# Patient Record
Sex: FEMALE | Race: WHITE | NOT HISPANIC OR LATINO | URBAN - METROPOLITAN AREA
[De-identification: names, ages, dates, MRNs, and addresses within clinical notes are randomized per-mention and may not be internally consistent; named-entity substitution may affect disease eponyms.]

---

## 2017-04-24 ENCOUNTER — OUTPATIENT (OUTPATIENT)
Dept: OUTPATIENT SERVICES | Facility: HOSPITAL | Age: 22
LOS: 1 days | Discharge: HOME | End: 2017-04-24

## 2017-06-27 DIAGNOSIS — R22.1 LOCALIZED SWELLING, MASS AND LUMP, NECK: ICD-10-CM

## 2017-06-27 DIAGNOSIS — R61 GENERALIZED HYPERHIDROSIS: ICD-10-CM

## 2017-06-27 DIAGNOSIS — R00.2 PALPITATIONS: ICD-10-CM

## 2017-10-02 ENCOUNTER — OUTPATIENT (OUTPATIENT)
Dept: OUTPATIENT SERVICES | Facility: HOSPITAL | Age: 22
LOS: 1 days | Discharge: HOME | End: 2017-10-02

## 2017-10-02 DIAGNOSIS — R61 GENERALIZED HYPERHIDROSIS: ICD-10-CM

## 2017-10-02 DIAGNOSIS — R10.2 PELVIC AND PERINEAL PAIN: ICD-10-CM

## 2017-12-26 ENCOUNTER — OUTPATIENT (OUTPATIENT)
Dept: OUTPATIENT SERVICES | Facility: HOSPITAL | Age: 22
LOS: 1 days | Discharge: HOME | End: 2017-12-26

## 2017-12-26 DIAGNOSIS — E78.5 HYPERLIPIDEMIA, UNSPECIFIED: ICD-10-CM

## 2017-12-26 DIAGNOSIS — R00.2 PALPITATIONS: ICD-10-CM

## 2017-12-26 DIAGNOSIS — D64.9 ANEMIA, UNSPECIFIED: ICD-10-CM

## 2017-12-26 DIAGNOSIS — R61 GENERALIZED HYPERHIDROSIS: ICD-10-CM

## 2017-12-26 DIAGNOSIS — R22.1 LOCALIZED SWELLING, MASS AND LUMP, NECK: ICD-10-CM

## 2018-08-03 ENCOUNTER — OUTPATIENT (OUTPATIENT)
Dept: OUTPATIENT SERVICES | Facility: HOSPITAL | Age: 23
LOS: 1 days | Discharge: HOME | End: 2018-08-03

## 2018-08-03 DIAGNOSIS — R00.2 PALPITATIONS: ICD-10-CM

## 2018-08-03 DIAGNOSIS — R22.1 LOCALIZED SWELLING, MASS AND LUMP, NECK: ICD-10-CM

## 2018-08-03 DIAGNOSIS — R61 GENERALIZED HYPERHIDROSIS: ICD-10-CM

## 2019-08-15 ENCOUNTER — OUTPATIENT (OUTPATIENT)
Dept: OUTPATIENT SERVICES | Facility: HOSPITAL | Age: 24
LOS: 1 days | Discharge: HOME | End: 2019-08-15

## 2019-08-15 DIAGNOSIS — E28.2 POLYCYSTIC OVARIAN SYNDROME: ICD-10-CM

## 2019-08-15 DIAGNOSIS — R00.2 PALPITATIONS: ICD-10-CM

## 2019-08-15 DIAGNOSIS — Z00.00 ENCOUNTER FOR GENERAL ADULT MEDICAL EXAMINATION WITHOUT ABNORMAL FINDINGS: ICD-10-CM

## 2019-08-15 DIAGNOSIS — E53.8 DEFICIENCY OF OTHER SPECIFIED B GROUP VITAMINS: ICD-10-CM

## 2019-08-15 DIAGNOSIS — E55.9 VITAMIN D DEFICIENCY, UNSPECIFIED: ICD-10-CM

## 2019-08-15 DIAGNOSIS — R61 GENERALIZED HYPERHIDROSIS: ICD-10-CM

## 2019-10-08 ENCOUNTER — EMERGENCY (EMERGENCY)
Facility: HOSPITAL | Age: 24
LOS: 0 days | Discharge: HOME | End: 2019-10-08
Attending: EMERGENCY MEDICINE | Admitting: EMERGENCY MEDICINE
Payer: COMMERCIAL

## 2019-10-08 VITALS
SYSTOLIC BLOOD PRESSURE: 114 MMHG | HEART RATE: 76 BPM | DIASTOLIC BLOOD PRESSURE: 77 MMHG | WEIGHT: 164.91 LBS | OXYGEN SATURATION: 99 % | TEMPERATURE: 98 F | HEIGHT: 61 IN | RESPIRATION RATE: 18 BRPM

## 2019-10-08 DIAGNOSIS — S50.811A ABRASION OF RIGHT FOREARM, INITIAL ENCOUNTER: ICD-10-CM

## 2019-10-08 DIAGNOSIS — S60.511A ABRASION OF RIGHT HAND, INITIAL ENCOUNTER: ICD-10-CM

## 2019-10-08 DIAGNOSIS — M79.644 PAIN IN RIGHT FINGER(S): ICD-10-CM

## 2019-10-08 DIAGNOSIS — Z88.0 ALLERGY STATUS TO PENICILLIN: ICD-10-CM
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
## 2019-10-08 PROCEDURE — 99283 EMERGENCY DEPT VISIT LOW MDM: CPT

## 2019-10-08 RX ADMIN — Medication 1 APPLICATION(S): at 12:04

## 2019-10-08 NOTE — CONSULT NOTE ADULT - SUBJECTIVE AND OBJECTIVE BOX
Plastic: 23 y.o. female was dragged by a dog 4 days ago  and sustained open deep abrasions to her right hand.  Was initially treated with steroids.     O/E: Open wounds over right dorsal index, middle, ring  PIP joints and middle, ring, little MCP joints. Lido 1% plain,  2.5cc total. Wounds sharply debrided then scrubbed with   Phisohex scrub brush and saline. No surrounding cellulitis.  Bacitracin, telfa, Kerlix roll. Rx: Silvadene (ER). Instructions  given. Will check in 2 days.

## 2019-10-08 NOTE — ED PROVIDER NOTE - NS ED ROS FT
Review of Systems    Constitutional: (-) fever or chills  msk: right hand injury  Integumentary: (+)abrasions   Neurological: (-) altered mental status, headache or head injury

## 2019-10-08 NOTE — ED PROVIDER NOTE - PHYSICAL EXAMINATION
msk: no bony tenderness, decreased rom of digits secondary to pain, good cap refill, no deformity. no wrist tenderness    skin: multiple abrasions to forearm and hand , no bleeding, +granulation tissue appreciated, no erythema

## 2019-10-08 NOTE — ED PROVIDER NOTE - ATTENDING CONTRIBUTION TO CARE
pt presents for wound care by Dr Lamas s/p injury as above, no signs of infection no tendon injury, wounds debrided and dressed, will d/c with silvadene to f/u in 2d. Patient counseled regarding conditions which should prompt return.

## 2019-10-08 NOTE — ED PROVIDER NOTE - PATIENT PORTAL LINK FT
You can access the FollowMyHealth Patient Portal offered by [facility] by registering at the following website: [email or web address]. By joining [facility]’s FollowMyHealth portal, you will also be able to view your health information using other applications (apps) compatible with our system.

## 2019-10-08 NOTE — ED PROVIDER NOTE - CARE PROVIDER_API CALL
José Lamas)  Plastic Surgery  [address] [place] [address]  Phone: [phone]  Fax: [phone]  Follow Up Time:

## 2019-10-08 NOTE — ED PROVIDER NOTE - OBJECTIVE STATEMENT
24 y/o female presents to the Ed s/p right forearm and hand injury one week ago. patient had negative xrays. patient c/o pain to right 3rd an 2nd digits and is currently taking prednisone . patient right hand dominant . patient without fever,chills. patient has been covering wounds .

## 2019-10-08 NOTE — ED ADULT TRIAGE NOTE - CHIEF COMPLAINT QUOTE
Pt states "I was dragged by a dog last week.  I think I have an infection in my hand and on my arm".

## 2022-03-14 ENCOUNTER — TRANSCRIPTION ENCOUNTER (OUTPATIENT)
Age: 27
End: 2022-03-14

## 2022-03-24 ENCOUNTER — INPATIENT (INPATIENT)
Facility: HOSPITAL | Age: 27
LOS: 1 days | Discharge: HOME | End: 2022-03-26
Attending: OBSTETRICS & GYNECOLOGY | Admitting: OBSTETRICS & GYNECOLOGY
Payer: COMMERCIAL

## 2022-03-24 VITALS
WEIGHT: 179.9 LBS | RESPIRATION RATE: 18 BRPM | OXYGEN SATURATION: 100 % | SYSTOLIC BLOOD PRESSURE: 139 MMHG | HEIGHT: 61 IN | DIASTOLIC BLOOD PRESSURE: 89 MMHG | TEMPERATURE: 98 F | HEART RATE: 98 BPM

## 2022-03-24 LAB
ALBUMIN SERPL ELPH-MCNC: 4.4 G/DL — SIGNIFICANT CHANGE UP (ref 3.5–5.2)
ALP SERPL-CCNC: 82 U/L — SIGNIFICANT CHANGE UP (ref 30–115)
ALT FLD-CCNC: 16 U/L — SIGNIFICANT CHANGE UP (ref 0–41)
ANION GAP SERPL CALC-SCNC: 12 MMOL/L — SIGNIFICANT CHANGE UP (ref 7–14)
APPEARANCE UR: CLEAR — SIGNIFICANT CHANGE UP
AST SERPL-CCNC: 14 U/L — SIGNIFICANT CHANGE UP (ref 0–41)
BASOPHILS # BLD AUTO: 0.05 K/UL — SIGNIFICANT CHANGE UP (ref 0–0.2)
BASOPHILS NFR BLD AUTO: 0.4 % — SIGNIFICANT CHANGE UP (ref 0–1)
BILIRUB SERPL-MCNC: <0.2 MG/DL — SIGNIFICANT CHANGE UP (ref 0.2–1.2)
BILIRUB UR-MCNC: NEGATIVE — SIGNIFICANT CHANGE UP
BUN SERPL-MCNC: 9 MG/DL — LOW (ref 10–20)
CALCIUM SERPL-MCNC: 9.5 MG/DL — SIGNIFICANT CHANGE UP (ref 8.5–10.1)
CHLORIDE SERPL-SCNC: 105 MMOL/L — SIGNIFICANT CHANGE UP (ref 98–110)
CO2 SERPL-SCNC: 23 MMOL/L — SIGNIFICANT CHANGE UP (ref 17–32)
COLOR SPEC: SIGNIFICANT CHANGE UP
CREAT SERPL-MCNC: 0.6 MG/DL — LOW (ref 0.7–1.5)
DIFF PNL FLD: NEGATIVE — SIGNIFICANT CHANGE UP
EGFR: 127 ML/MIN/1.73M2 — SIGNIFICANT CHANGE UP
EOSINOPHIL # BLD AUTO: 0.08 K/UL — SIGNIFICANT CHANGE UP (ref 0–0.7)
EOSINOPHIL NFR BLD AUTO: 0.7 % — SIGNIFICANT CHANGE UP (ref 0–8)
GLUCOSE SERPL-MCNC: 130 MG/DL — HIGH (ref 70–99)
GLUCOSE UR QL: NEGATIVE — SIGNIFICANT CHANGE UP
HCT VFR BLD CALC: 37.7 % — SIGNIFICANT CHANGE UP (ref 37–47)
HGB BLD-MCNC: 12.7 G/DL — SIGNIFICANT CHANGE UP (ref 12–16)
IMM GRANULOCYTES NFR BLD AUTO: 0.3 % — SIGNIFICANT CHANGE UP (ref 0.1–0.3)
KETONES UR-MCNC: NEGATIVE — SIGNIFICANT CHANGE UP
LACTATE SERPL-SCNC: 1.2 MMOL/L — SIGNIFICANT CHANGE UP (ref 0.7–2)
LEUKOCYTE ESTERASE UR-ACNC: NEGATIVE — SIGNIFICANT CHANGE UP
LIDOCAIN IGE QN: 27 U/L — SIGNIFICANT CHANGE UP (ref 7–60)
LYMPHOCYTES # BLD AUTO: 25.6 % — SIGNIFICANT CHANGE UP (ref 20.5–51.1)
LYMPHOCYTES # BLD AUTO: 3.02 K/UL — SIGNIFICANT CHANGE UP (ref 1.2–3.4)
MCHC RBC-ENTMCNC: 29 PG — SIGNIFICANT CHANGE UP (ref 27–31)
MCHC RBC-ENTMCNC: 33.7 G/DL — SIGNIFICANT CHANGE UP (ref 32–37)
MCV RBC AUTO: 86.1 FL — SIGNIFICANT CHANGE UP (ref 81–99)
MONOCYTES # BLD AUTO: 0.71 K/UL — HIGH (ref 0.1–0.6)
MONOCYTES NFR BLD AUTO: 6 % — SIGNIFICANT CHANGE UP (ref 1.7–9.3)
NEUTROPHILS # BLD AUTO: 7.9 K/UL — HIGH (ref 1.4–6.5)
NEUTROPHILS NFR BLD AUTO: 67 % — SIGNIFICANT CHANGE UP (ref 42.2–75.2)
NITRITE UR-MCNC: NEGATIVE — SIGNIFICANT CHANGE UP
NRBC # BLD: 0 /100 WBCS — SIGNIFICANT CHANGE UP (ref 0–0)
PH UR: 7 — SIGNIFICANT CHANGE UP (ref 5–8)
PLATELET # BLD AUTO: 399 K/UL — SIGNIFICANT CHANGE UP (ref 130–400)
POTASSIUM SERPL-MCNC: 4.3 MMOL/L — SIGNIFICANT CHANGE UP (ref 3.5–5)
POTASSIUM SERPL-SCNC: 4.3 MMOL/L — SIGNIFICANT CHANGE UP (ref 3.5–5)
PROT SERPL-MCNC: 6.6 G/DL — SIGNIFICANT CHANGE UP (ref 6–8)
PROT UR-MCNC: NEGATIVE — SIGNIFICANT CHANGE UP
RBC # BLD: 4.38 M/UL — SIGNIFICANT CHANGE UP (ref 4.2–5.4)
RBC # FLD: 12.7 % — SIGNIFICANT CHANGE UP (ref 11.5–14.5)
SODIUM SERPL-SCNC: 140 MMOL/L — SIGNIFICANT CHANGE UP (ref 135–146)
SP GR SPEC: 1.02 — SIGNIFICANT CHANGE UP (ref 1.01–1.03)
UROBILINOGEN FLD QL: SIGNIFICANT CHANGE UP
WBC # BLD: 11.8 K/UL — HIGH (ref 4.8–10.8)
WBC # FLD AUTO: 11.8 K/UL — HIGH (ref 4.8–10.8)

## 2022-03-24 PROCEDURE — 99285 EMERGENCY DEPT VISIT HI MDM: CPT

## 2022-03-24 PROCEDURE — 76830 TRANSVAGINAL US NON-OB: CPT | Mod: 26

## 2022-03-24 RX ORDER — MORPHINE SULFATE 50 MG/1
4 CAPSULE, EXTENDED RELEASE ORAL ONCE
Refills: 0 | Status: DISCONTINUED | OUTPATIENT
Start: 2022-03-24 | End: 2022-03-24

## 2022-03-24 RX ORDER — ONDANSETRON 8 MG/1
4 TABLET, FILM COATED ORAL ONCE
Refills: 0 | Status: COMPLETED | OUTPATIENT
Start: 2022-03-24 | End: 2022-03-24

## 2022-03-24 RX ORDER — SODIUM CHLORIDE 9 MG/ML
1000 INJECTION INTRAMUSCULAR; INTRAVENOUS; SUBCUTANEOUS ONCE
Refills: 0 | Status: COMPLETED | OUTPATIENT
Start: 2022-03-24 | End: 2022-03-24

## 2022-03-24 RX ADMIN — SODIUM CHLORIDE 1000 MILLILITER(S): 9 INJECTION INTRAMUSCULAR; INTRAVENOUS; SUBCUTANEOUS at 21:10

## 2022-03-24 RX ADMIN — MORPHINE SULFATE 4 MILLIGRAM(S): 50 CAPSULE, EXTENDED RELEASE ORAL at 22:15

## 2022-03-24 RX ADMIN — MORPHINE SULFATE 4 MILLIGRAM(S): 50 CAPSULE, EXTENDED RELEASE ORAL at 20:03

## 2022-03-24 RX ADMIN — MORPHINE SULFATE 4 MILLIGRAM(S): 50 CAPSULE, EXTENDED RELEASE ORAL at 23:51

## 2022-03-24 RX ADMIN — SODIUM CHLORIDE 1000 MILLILITER(S): 9 INJECTION INTRAMUSCULAR; INTRAVENOUS; SUBCUTANEOUS at 20:04

## 2022-03-24 RX ADMIN — MORPHINE SULFATE 4 MILLIGRAM(S): 50 CAPSULE, EXTENDED RELEASE ORAL at 20:35

## 2022-03-24 RX ADMIN — MORPHINE SULFATE 4 MILLIGRAM(S): 50 CAPSULE, EXTENDED RELEASE ORAL at 21:45

## 2022-03-24 RX ADMIN — ONDANSETRON 4 MILLIGRAM(S): 8 TABLET, FILM COATED ORAL at 20:03

## 2022-03-24 NOTE — ED PROVIDER NOTE - PHYSICAL EXAMINATION
CONSTITUTIONAL: Well-appearing; well-nourished; in no apparent distress.   NECK: Supple; non-tender; no cervical lymphadenopathy.   CARDIOVASCULAR: Normal S1, S2; no murmurs, rubs, or gallops.   RESPIRATORY: Normal chest excursion with respiration; breath sounds clear and equal bilaterally; no wheezes, rhonchi, or rales.  GI/: ttp RLQ; Normal bowel sounds; non-distended  MS: No CVA tenderness. Normal ROM in all four extremities; non-tender to palpation; distal pulses are normal.   SKIN: Normal for age and race; warm; dry; good turgor; no apparent lesions or exudate.   NEURO/PSYCH: A & O x 4; grossly unremarkable. mood and manner are appropriate.

## 2022-03-24 NOTE — ED PROVIDER NOTE - OBJECTIVE STATEMENT
pt with no sig pmhx presents to ED for eval/tx of RLQ pain. reports pain has been intermittent for approx 2 weeks, saw GYN Yayo who gave rx for outpt US which she has sched for next week. pain over the last few days became more constant and today, more severe, associated with nausea, no vomiting. pain is sharp, nonradiating, moderate. denies exacerbating or relieving factors. Denies fever/chill/HA/dizziness/chest pain/palpitation/sob/v/d/ black stool/bloody stool/urinary sxs

## 2022-03-24 NOTE — ED PROVIDER NOTE - ATTENDING CONTRIBUTION TO CARE
26-year-old female presents to the ED for severe right lower quadrant sharp abdominal pain.  Started 2 days prior.  Pending outpatient pelvic sonogram.  Physical exam revealed right lower quadrant tenderness to palpation.  No rebound or guarding noted.  We obtained labs, CT imaging, transvaginal ultrasound.  UA unremarkable.  Labs reviewed by me, values noted to be within normal limits.  Transvaginal ultrasound revealed an enlarged right ovary with a 4.3 cm hemorrhagic cyst with free fluid in the pelvis.  Given morphine with moderate pain relief.  Taken to CT to rule out additional intra-abdominal pathology.    Case was signed out to  pending CT abdomen and final disposition 26-year-old female presents to the ED for severe right lower quadrant sharp abdominal pain.  Started 2 days prior.  Pending outpatient pelvic sonogram.  Physical exam revealed right lower quadrant tenderness to palpation.  No rebound or guarding noted.  We obtained labs, CT imaging, transvaginal ultrasound.  UA unremarkable.  Labs reviewed by me, values noted to be within normal limits.  Transvaginal ultrasound revealed an enlarged right ovary with a 4.3 cm hemorrhagic cyst with free fluid in the pelvis.  Given morphine with moderate pain relief.  Taken to CT to rule out additional intra-abdominal pathology.    Case was signed out to Dr. Chatman pending CT abdomen and final disposition

## 2022-03-24 NOTE — ED ADULT TRIAGE NOTE - CHIEF COMPLAINT QUOTE
Patient complaining of right sided abdominal pain that started 30 minutes pta, nonradiating- patient complaining of nausea, no vomiting or diarrhea

## 2022-03-24 NOTE — ED PROVIDER NOTE - CLINICAL SUMMARY MEDICAL DECISION MAKING FREE TEXT BOX
26-year-old female presents to the ED for severe right lower quadrant sharp abdominal pain. Labs and imaging reviewed. Gynecology consult appreciated. Patient admitted to Gynecology for intractable pain in setting of hemorrhagic right ovarian cyst.

## 2022-03-24 NOTE — ED PROVIDER NOTE - NS ED ATTENDING STATEMENT MOD
This was a shared visit with the SONIA. I reviewed and verified the documentation and independently performed the documented:

## 2022-03-25 ENCOUNTER — TRANSCRIPTION ENCOUNTER (OUTPATIENT)
Age: 27
End: 2022-03-25

## 2022-03-25 DIAGNOSIS — Z98.890 OTHER SPECIFIED POSTPROCEDURAL STATES: Chronic | ICD-10-CM

## 2022-03-25 DIAGNOSIS — Z90.89 ACQUIRED ABSENCE OF OTHER ORGANS: Chronic | ICD-10-CM

## 2022-03-25 PROBLEM — Z78.9 OTHER SPECIFIED HEALTH STATUS: Chronic | Status: ACTIVE | Noted: 2019-10-08

## 2022-03-25 LAB
BASOPHILS # BLD AUTO: 0.04 K/UL — SIGNIFICANT CHANGE UP (ref 0–0.2)
BASOPHILS # BLD AUTO: 0.06 K/UL — SIGNIFICANT CHANGE UP (ref 0–0.2)
BASOPHILS NFR BLD AUTO: 0.3 % — SIGNIFICANT CHANGE UP (ref 0–1)
BASOPHILS NFR BLD AUTO: 0.3 % — SIGNIFICANT CHANGE UP (ref 0–1)
CULTURE RESULTS: SIGNIFICANT CHANGE UP
EOSINOPHIL # BLD AUTO: 0.01 K/UL — SIGNIFICANT CHANGE UP (ref 0–0.7)
EOSINOPHIL # BLD AUTO: 0.03 K/UL — SIGNIFICANT CHANGE UP (ref 0–0.7)
EOSINOPHIL NFR BLD AUTO: 0.1 % — SIGNIFICANT CHANGE UP (ref 0–8)
EOSINOPHIL NFR BLD AUTO: 0.2 % — SIGNIFICANT CHANGE UP (ref 0–8)
HCT VFR BLD CALC: 31.2 % — LOW (ref 37–47)
HCT VFR BLD CALC: 35.9 % — LOW (ref 37–47)
HGB BLD-MCNC: 10.3 G/DL — LOW (ref 12–16)
HGB BLD-MCNC: 11.4 G/DL — LOW (ref 12–16)
IMM GRANULOCYTES NFR BLD AUTO: 0.5 % — HIGH (ref 0.1–0.3)
IMM GRANULOCYTES NFR BLD AUTO: 0.6 % — HIGH (ref 0.1–0.3)
LYMPHOCYTES # BLD AUTO: 13.7 % — LOW (ref 20.5–51.1)
LYMPHOCYTES # BLD AUTO: 19.1 % — LOW (ref 20.5–51.1)
LYMPHOCYTES # BLD AUTO: 2.5 K/UL — SIGNIFICANT CHANGE UP (ref 1.2–3.4)
LYMPHOCYTES # BLD AUTO: 2.53 K/UL — SIGNIFICANT CHANGE UP (ref 1.2–3.4)
MCHC RBC-ENTMCNC: 28.5 PG — SIGNIFICANT CHANGE UP (ref 27–31)
MCHC RBC-ENTMCNC: 29 PG — SIGNIFICANT CHANGE UP (ref 27–31)
MCHC RBC-ENTMCNC: 31.8 G/DL — LOW (ref 32–37)
MCHC RBC-ENTMCNC: 33 G/DL — SIGNIFICANT CHANGE UP (ref 32–37)
MCV RBC AUTO: 87.9 FL — SIGNIFICANT CHANGE UP (ref 81–99)
MCV RBC AUTO: 89.8 FL — SIGNIFICANT CHANGE UP (ref 81–99)
MONOCYTES # BLD AUTO: 0.97 K/UL — HIGH (ref 0.1–0.6)
MONOCYTES # BLD AUTO: 1.08 K/UL — HIGH (ref 0.1–0.6)
MONOCYTES NFR BLD AUTO: 5.3 % — SIGNIFICANT CHANGE UP (ref 1.7–9.3)
MONOCYTES NFR BLD AUTO: 8.1 % — SIGNIFICANT CHANGE UP (ref 1.7–9.3)
NEUTROPHILS # BLD AUTO: 14.65 K/UL — HIGH (ref 1.4–6.5)
NEUTROPHILS # BLD AUTO: 9.52 K/UL — HIGH (ref 1.4–6.5)
NEUTROPHILS NFR BLD AUTO: 71.7 % — SIGNIFICANT CHANGE UP (ref 42.2–75.2)
NEUTROPHILS NFR BLD AUTO: 80.1 % — HIGH (ref 42.2–75.2)
NRBC # BLD: 0 /100 WBCS — SIGNIFICANT CHANGE UP (ref 0–0)
NRBC # BLD: 0 /100 WBCS — SIGNIFICANT CHANGE UP (ref 0–0)
PLATELET # BLD AUTO: 356 K/UL — SIGNIFICANT CHANGE UP (ref 130–400)
PLATELET # BLD AUTO: 388 K/UL — SIGNIFICANT CHANGE UP (ref 130–400)
RBC # BLD: 3.55 M/UL — LOW (ref 4.2–5.4)
RBC # BLD: 4 M/UL — LOW (ref 4.2–5.4)
RBC # FLD: 12.7 % — SIGNIFICANT CHANGE UP (ref 11.5–14.5)
RBC # FLD: 12.9 % — SIGNIFICANT CHANGE UP (ref 11.5–14.5)
SARS-COV-2 RNA SPEC QL NAA+PROBE: SIGNIFICANT CHANGE UP
SPECIMEN SOURCE: SIGNIFICANT CHANGE UP
WBC # BLD: 13.28 K/UL — HIGH (ref 4.8–10.8)
WBC # BLD: 18.29 K/UL — HIGH (ref 4.8–10.8)
WBC # FLD AUTO: 13.28 K/UL — HIGH (ref 4.8–10.8)
WBC # FLD AUTO: 18.29 K/UL — HIGH (ref 4.8–10.8)

## 2022-03-25 PROCEDURE — 74177 CT ABD & PELVIS W/CONTRAST: CPT | Mod: 26,MA

## 2022-03-25 PROCEDURE — 88305 TISSUE EXAM BY PATHOLOGIST: CPT | Mod: 26

## 2022-03-25 RX ORDER — HYDROMORPHONE HYDROCHLORIDE 2 MG/ML
1 INJECTION INTRAMUSCULAR; INTRAVENOUS; SUBCUTANEOUS
Refills: 0 | Status: DISCONTINUED | OUTPATIENT
Start: 2022-03-25 | End: 2022-03-25

## 2022-03-25 RX ORDER — MEPERIDINE HYDROCHLORIDE 50 MG/ML
12.5 INJECTION INTRAMUSCULAR; INTRAVENOUS; SUBCUTANEOUS
Refills: 0 | Status: DISCONTINUED | OUTPATIENT
Start: 2022-03-25 | End: 2022-03-25

## 2022-03-25 RX ORDER — ONDANSETRON 8 MG/1
4 TABLET, FILM COATED ORAL ONCE
Refills: 0 | Status: DISCONTINUED | OUTPATIENT
Start: 2022-03-25 | End: 2022-03-25

## 2022-03-25 RX ORDER — ACETAMINOPHEN 500 MG
1000 TABLET ORAL ONCE
Refills: 0 | Status: COMPLETED | OUTPATIENT
Start: 2022-03-25 | End: 2022-03-25

## 2022-03-25 RX ORDER — ONDANSETRON 8 MG/1
4 TABLET, FILM COATED ORAL ONCE
Refills: 0 | Status: COMPLETED | OUTPATIENT
Start: 2022-03-25 | End: 2022-03-25

## 2022-03-25 RX ORDER — OXYCODONE HYDROCHLORIDE 5 MG/1
5 TABLET ORAL EVERY 6 HOURS
Refills: 0 | Status: DISCONTINUED | OUTPATIENT
Start: 2022-03-25 | End: 2022-03-26

## 2022-03-25 RX ORDER — SODIUM CHLORIDE 9 MG/ML
1000 INJECTION, SOLUTION INTRAVENOUS
Refills: 0 | Status: DISCONTINUED | OUTPATIENT
Start: 2022-03-25 | End: 2022-03-25

## 2022-03-25 RX ORDER — SODIUM CHLORIDE 9 MG/ML
1000 INJECTION INTRAMUSCULAR; INTRAVENOUS; SUBCUTANEOUS ONCE
Refills: 0 | Status: COMPLETED | OUTPATIENT
Start: 2022-03-25 | End: 2022-03-25

## 2022-03-25 RX ORDER — PROPRANOLOL HCL 160 MG
0 CAPSULE, EXTENDED RELEASE 24HR ORAL
Qty: 0 | Refills: 0 | DISCHARGE

## 2022-03-25 RX ORDER — KETOROLAC TROMETHAMINE 30 MG/ML
30 SYRINGE (ML) INJECTION EVERY 6 HOURS
Refills: 0 | Status: DISCONTINUED | OUTPATIENT
Start: 2022-03-25 | End: 2022-03-25

## 2022-03-25 RX ORDER — MORPHINE SULFATE 50 MG/1
2 CAPSULE, EXTENDED RELEASE ORAL EVERY 6 HOURS
Refills: 0 | Status: DISCONTINUED | OUTPATIENT
Start: 2022-03-25 | End: 2022-03-25

## 2022-03-25 RX ORDER — HYDROMORPHONE HYDROCHLORIDE 2 MG/ML
0.5 INJECTION INTRAMUSCULAR; INTRAVENOUS; SUBCUTANEOUS ONCE
Refills: 0 | Status: DISCONTINUED | OUTPATIENT
Start: 2022-03-25 | End: 2022-03-25

## 2022-03-25 RX ORDER — IBUPROFEN 200 MG
600 TABLET ORAL EVERY 6 HOURS
Refills: 0 | Status: DISCONTINUED | OUTPATIENT
Start: 2022-03-25 | End: 2022-03-26

## 2022-03-25 RX ORDER — HYDROMORPHONE HYDROCHLORIDE 2 MG/ML
0.5 INJECTION INTRAMUSCULAR; INTRAVENOUS; SUBCUTANEOUS
Refills: 0 | Status: DISCONTINUED | OUTPATIENT
Start: 2022-03-25 | End: 2022-03-25

## 2022-03-25 RX ORDER — SPIRONOLACTONE 25 MG/1
1 TABLET, FILM COATED ORAL
Qty: 0 | Refills: 0 | DISCHARGE

## 2022-03-25 RX ORDER — ACETAMINOPHEN 500 MG
1000 TABLET ORAL ONCE
Refills: 0 | Status: DISCONTINUED | OUTPATIENT
Start: 2022-03-25 | End: 2022-03-25

## 2022-03-25 RX ORDER — ACETAMINOPHEN 500 MG
975 TABLET ORAL EVERY 6 HOURS
Refills: 0 | Status: DISCONTINUED | OUTPATIENT
Start: 2022-03-25 | End: 2022-03-26

## 2022-03-25 RX ADMIN — SODIUM CHLORIDE 1000 MILLILITER(S): 9 INJECTION INTRAMUSCULAR; INTRAVENOUS; SUBCUTANEOUS at 02:45

## 2022-03-25 RX ADMIN — SODIUM CHLORIDE 125 MILLILITER(S): 9 INJECTION, SOLUTION INTRAVENOUS at 13:22

## 2022-03-25 RX ADMIN — Medication 975 MILLIGRAM(S): at 23:49

## 2022-03-25 RX ADMIN — HYDROMORPHONE HYDROCHLORIDE 0.5 MILLIGRAM(S): 2 INJECTION INTRAMUSCULAR; INTRAVENOUS; SUBCUTANEOUS at 01:45

## 2022-03-25 RX ADMIN — Medication 30 MILLIGRAM(S): at 11:05

## 2022-03-25 RX ADMIN — ONDANSETRON 4 MILLIGRAM(S): 8 TABLET, FILM COATED ORAL at 06:22

## 2022-03-25 RX ADMIN — Medication 30 MILLIGRAM(S): at 17:11

## 2022-03-25 RX ADMIN — Medication 30 MILLIGRAM(S): at 06:22

## 2022-03-25 RX ADMIN — Medication 30 MILLIGRAM(S): at 17:24

## 2022-03-25 RX ADMIN — Medication 30 MILLIGRAM(S): at 11:19

## 2022-03-25 RX ADMIN — HYDROMORPHONE HYDROCHLORIDE 0.5 MILLIGRAM(S): 2 INJECTION INTRAMUSCULAR; INTRAVENOUS; SUBCUTANEOUS at 03:32

## 2022-03-25 RX ADMIN — MORPHINE SULFATE 4 MILLIGRAM(S): 50 CAPSULE, EXTENDED RELEASE ORAL at 00:20

## 2022-03-25 RX ADMIN — OXYCODONE HYDROCHLORIDE 5 MILLIGRAM(S): 5 TABLET ORAL at 22:25

## 2022-03-25 RX ADMIN — HYDROMORPHONE HYDROCHLORIDE 0.5 MILLIGRAM(S): 2 INJECTION INTRAMUSCULAR; INTRAVENOUS; SUBCUTANEOUS at 01:15

## 2022-03-25 RX ADMIN — HYDROMORPHONE HYDROCHLORIDE 1 MILLIGRAM(S): 2 INJECTION INTRAMUSCULAR; INTRAVENOUS; SUBCUTANEOUS at 20:51

## 2022-03-25 RX ADMIN — Medication 400 MILLIGRAM(S): at 14:14

## 2022-03-25 RX ADMIN — MORPHINE SULFATE 2 MILLIGRAM(S): 50 CAPSULE, EXTENDED RELEASE ORAL at 08:18

## 2022-03-25 RX ADMIN — Medication 400 MILLIGRAM(S): at 08:31

## 2022-03-25 RX ADMIN — ONDANSETRON 4 MILLIGRAM(S): 8 TABLET, FILM COATED ORAL at 00:51

## 2022-03-25 RX ADMIN — Medication 600 MILLIGRAM(S): at 23:49

## 2022-03-25 RX ADMIN — HYDROMORPHONE HYDROCHLORIDE 1 MILLIGRAM(S): 2 INJECTION INTRAMUSCULAR; INTRAVENOUS; SUBCUTANEOUS at 21:20

## 2022-03-25 RX ADMIN — MORPHINE SULFATE 2 MILLIGRAM(S): 50 CAPSULE, EXTENDED RELEASE ORAL at 08:06

## 2022-03-25 RX ADMIN — SODIUM CHLORIDE 125 MILLILITER(S): 9 INJECTION, SOLUTION INTRAVENOUS at 19:50

## 2022-03-25 RX ADMIN — SODIUM CHLORIDE 1000 MILLILITER(S): 9 INJECTION INTRAMUSCULAR; INTRAVENOUS; SUBCUTANEOUS at 03:53

## 2022-03-25 RX ADMIN — Medication 1000 MILLIGRAM(S): at 08:45

## 2022-03-25 RX ADMIN — ONDANSETRON 4 MILLIGRAM(S): 8 TABLET, FILM COATED ORAL at 22:20

## 2022-03-25 NOTE — PRE-OP CHECKLIST - SELECT TESTS ORDERED
CBC/CMP/Hepatic Function/Urinalysis/UCG/COVID-19 urine pregnancy negative on ED flow sheet from 03/24/2022/CBC/CMP/Hepatic Function/Urinalysis/UCG/COVID-19

## 2022-03-25 NOTE — H&P ADULT - NSHPLABSRESULTS_GEN_ALL_CORE
LABS:                        11.4   18.29 )-----------( 388      ( 25 Mar 2022 03:00 )             35.9                           12.7   11.80 )-----------( 399      ( 24 Mar 2022 19:39 )             37.7         140  |  105  |  9<L>  ----------------------------<  130<H>  4.3   |  23  |  0.6<L>  Ca    9.5      24 Mar 2022 19:39  TPro  6.6  /  Alb  4.4  /  TBili  <0.2  /  DBili  x   /  AST  14  /  ALT  16  /  AlkPhos  82      UPREG: Negative     Urinalysis Basic - ( 24 Mar 2022 20:46 )  Color: Light Yellow / Appearance: Clear / S.018 / pH: x  Gluc: x / Ketone: Negative  / Bili: Negative / Urobili: <2 mg/dL   Blood: x / Protein: Negative / Nitrite: Negative   Leuk Esterase: Negative / RBC: x / WBC x   Sq Epi: x / Non Sq Epi: x / Bacteria: x    < from: CT Abdomen and Pelvis w/ IV Cont (22 @ 00:08) >  IMPRESSION:  4.7 cm right adnexal cyst, suspected to be a hemorrhagic cyst on recent   ultrasound. Small volume complex free fluid in the abdomen and pelvis.   Constellation of findings may reflect a ruptured hemorrhagic cyst.  Unremarkable appearing appendix.  --- End of Report ---  < end of copied text >      < from: US Transvaginal (22 @ 21:41) >  IMPRESSION:  Enlarged right ovary with a 4.3 cm hemorrhagic cyst.  Free fluid in the pelvis as wellas trace free fluid in the abdomen.  < end of copied text > LABS:                        11.4   18.29 )-----------( 388      ( 25 Mar 2022 03:00 )             35.9                           12.7   11.80 )-----------( 399      ( 24 Mar 2022 19:39 )             37.7         140  |  105  |  9<L>  ----------------------------<  130<H>  4.3   |  23  |  0.6<L>  Ca    9.5      24 Mar 2022 19:39  TPro  6.6  /  Alb  4.4  /  TBili  <0.2  /  DBili  x   /  AST  14  /  ALT  16  /  AlkPhos  82      UPREG: Negative     Urinalysis Basic - ( 24 Mar 2022 20:46 )  Color: Light Yellow / Appearance: Clear / S.018 / pH: x  Gluc: x / Ketone: Negative  / Bili: Negative / Urobili: <2 mg/dL   Blood: x / Protein: Negative / Nitrite: Negative   Leuk Esterase: Negative / RBC: x / WBC x   Sq Epi: x / Non Sq Epi: x / Bacteria: x    < from: CT Abdomen and Pelvis w/ IV Cont (22 @ 00:08) >  IMPRESSION:  4.7 cm right adnexal cyst, suspected to be a hemorrhagic cyst on recent   ultrasound. Small volume complex free fluid in the abdomen and pelvis.   Constellation of findings may reflect a ruptured hemorrhagic cyst.  Unremarkable appearing appendix.  --- End of Report ---  < end of copied text >      < from: US Transvaginal (22 @ 21:41) >  IMPRESSION:  Enlarged right ovary with a 4.3 cm hemorrhagic cyst.  Free fluid in the pelvis as well as trace free fluid in the abdomen.  < end of copied text >

## 2022-03-25 NOTE — BRIEF OPERATIVE NOTE - OPERATION/FINDINGS
On laparoscopy, approximately 200cc hemoperitoneum noted with ruptured 4cm right ovarian cyst. Right ovarian cyst removed and cyst wall removed. Normal uterus, normal bilateral fallopian tubes, normal bilateral ovaries. On laparoscopy, approximately 200cc hemoperitoneum noted with ruptured 4cm right ovarian cyst. Right ovarian cyst wall removed from right ovary. Normal uterus, normal bilateral fallopian tubes, normal bilateral ovaries.

## 2022-03-25 NOTE — H&P ADULT - NSHPPHYSICALEXAM_GEN_ALL_CORE
Vital Signs Last 24 Hrs  T(F): 98.8 (24 Mar 2022 23:01), Max: 98.8 (24 Mar 2022 23:01)  HR: 82 (24 Mar 2022 23:01) (82 - 98)  BP: 118/74 (24 Mar 2022 23:01) (118/74 - 139/89)  RR: 19 (24 Mar 2022 23:01) (18 - 19)  Height (cm): 154.9 (03-24-22 @ 19:03)  Weight (kg): 81.6 (03-24-22 @ 19:03)  BMI (kg/m2): 34 (03-24-22 @ 19:03)  BSA (m2): 1.81 (03-24-22 @ 19:03)    General Appearance - AAOx3, NAD  Heart - S1S2 regular rate and rhythm  Lung - CTA Bilaterally  Abdomen - Soft, nondistended, no rebound, no rigidity, bowel sounds present; RLQ guarding and tenderness to palpation    GYN/Pelvis:  Labia Majora - Normal  Labia Minora - Normal  Clitoris - Normal  Urethra - Normal  Vagina - Normal  Cervix - Normal, no CMT, normal physiological discharge, no bleeding    Uterus:  Size - Normal  Tenderness - None  Mass - None  Freely mobile    Adnexa:  Masses - None  Tenderness - Right and Left, R>L

## 2022-03-25 NOTE — H&P ADULT - ASSESSMENT
25yo G0, with RLQ pain, right hemorrhagic cyst 4.3cm and free fluid in pelvis/abdomen on imaging, likely secondary to ruptured hemorrhagic cyst,  clinically and hemodynamically stable.   - admit to GYN  - diet: NPO  - IV fluid hydration   - vital signs q4 hours  - DVT ppx: SCDs, ambulate as tolerated  - serial CBCs  - pain management: Toradol IV q6, IV tylenol PRN  - encourage incentive spirometry use    Dr Ayala and Dr Zee aware.

## 2022-03-25 NOTE — PATIENT PROFILE ADULT - DATE OF FIRST COVID-19 BOOSTER
Dr Sandy Coombs,   I am sending echocardiogram report to  on this patient for your review  Just making you aware we received a fax and it will be in the patient's chart  13-Jan-2022

## 2022-03-25 NOTE — CONSULT NOTE ADULT - SUBJECTIVE AND OBJECTIVE BOX
Chief Complaint: RLQ pain    HPI: 25yo G0 LMP 22 presents to the ED with right lower quadrant pain, GYN consulted for R ovarian cyst noted on imaging. For the last 10 days, patient has experienced intermittent sharp right lower quadrant. But at 1730, after intercourse with her , started to experience 10/10 sharp RLQ pain, associated with nausea. Pt reports she was crawled up into a ball on the floor, crying, due to the severity of the pain. Not radiating, worsened by movement. Tried PO meds at home, did not alleviate symptoms. In the ED received morphine three times, did not alleviate symptoms. Just received Dilaudid prior to GYN evaluation, pain now 9/10 intensity. Denies HA, CP, SOB, vomiting, fevers, chills, vaginal bleeding, urinary symptoms, changes in bowel habits. Last PO intake at 1400.     Ob/Gyn History:                   LMP - 22                  Cycle Length - q30 days, lasting 3-5 days  Denies history of ovarian cysts, uterine fibroids, abnormal paps, or STIs    Denies the following: constitutional symptoms, visual symptoms, cardiovascular symptoms, respiratory symptoms, GI symptoms, musculoskeletal symptoms, skin symptoms, neurologic symptoms, hematologic symptoms, allergic symptoms, psychiatric symptoms  Except any pertinent positives listed.     PAST MEDICAL & SURGICAL HISTORY:  No pertinent past medical history    Home Medications:  Accutane     Allergies:  penicillin - family history of allergy to PCN so patient has never taken it before    FAMILY HISTORY:  DM - father    SOCIAL HISTORY: Denies cigarette use, alcohol use, or illicit drug use    Vital Signs Last 24 Hrs  T(F): 98.8 (24 Mar 2022 23:01), Max: 98.8 (24 Mar 2022 23:01)  HR: 82 (24 Mar 2022 23:01) (82 - 98)  BP: 118/74 (24 Mar 2022 23:01) (118/74 - 139/89)  RR: 19 (24 Mar 2022 23:01) (18 - 19)  Height (cm): 154.9 (22 @ 19:03)  Weight (kg): 81.6 (22 @ 19:03)  BMI (kg/m2): 34 (22 @ 19:03)  BSA (m2): 1.81 (22 @ 19:03)    General Appearance - AAOx3, NAD  Heart - S1S2 regular rate and rhythm  Lung - CTA Bilaterally  Abdomen - Soft, nondistended, no rebound, no rigidity, bowel sounds present; RLQ guarding and tenderness to palpation    GYN/Pelvis:  Labia Majora - Normal  Labia Minora - Normal  Clitoris - Normal  Urethra - Normal  Vagina - Normal  Cervix - Normal, no CMT, normal physiological discharge, no bleeding    Uterus:  Size - Normal  Tenderness - None  Mass - None  Freely mobile    Adnexa:  Masses - None  Tenderness - Right and Left, R>L    Meds:   morphine  - Injectable 4 milliGRAM(s) IV Push Once  morphine  - Injectable 4 milliGRAM(s) IV Push Once  morphine  - Injectable 4 milliGRAM(s) IV Push once  ondansetron Injectable 4 milliGRAM(s) IV Push once  ondansetron Injectable 4 milliGRAM(s) IV Push once  sodium chloride 0.9% Bolus 1000 milliLiter(s) IV Bolus once    LABS:                        12.7   11.80 )-----------( 399      ( 24 Mar 2022 19:39 )             37.7         140  |  105  |  9<L>  ----------------------------<  130<H>  4.3   |  23  |  0.6<L>    Ca    9.5      24 Mar 2022 19:39    TPro  6.6  /  Alb  4.4  /  TBili  <0.2  /  DBili  x   /  AST  14  /  ALT  16  /  AlkPhos  82      UPREG: Negative     Urinalysis Basic - ( 24 Mar 2022 20:46 )  Color: Light Yellow / Appearance: Clear / S.018 / pH: x  Gluc: x / Ketone: Negative  / Bili: Negative / Urobili: <2 mg/dL   Blood: x / Protein: Negative / Nitrite: Negative   Leuk Esterase: Negative / RBC: x / WBC x   Sq Epi: x / Non Sq Epi: x / Bacteria: x    RADIOLOGY & ADDITIONAL STUDIES:  < from: CT Abdomen and Pelvis w/ IV Cont (22 @ 00:08) >    ******PRELIMINARY REPORT******      ******PRELIMINARY REPORT******       ACC: [id number] EXAM:  CT ABDOMEN AND PELVIS IC                          PROCEDURE DATE:  2022    ******PRELIMINARY REPORT******      ******PRELIMINARY REPORT******     INTERPRETATION:  CLINICAL STATEMENT: Right lower quadrant abdominal pain.    TECHNIQUE: Contiguous axial CT images were obtained from the lower chest   to the pubic symphysis following administration of 100cc Omnipaque 350   intravenous contrast.Oral contrast was not administered.  Reformatted   images in the coronal and sagittal planes were acquired.    COMPARISON CT: None.    OTHER STUDIES USED FOR CORRELATION: Pelvic ultrasound 3/24/2022.    FINDINGS:    LOWER CHEST: Unremarkable.    HEPATOBILIARY: Unremarkable.    SPLEEN: Unremarkable.    PANCREAS: Unremarkable.    ADRENAL GLANDS: Unremarkable.    KIDNEYS: Symmetric bilateral renal enhancement. No hydronephrosis.    ABDOMINOPELVIC NODES: Unremarkable.    PELVIC ORGANS: 4.7 x 3.5 cmright adnexal cystic structure, was noted to   be a hemorrhagic cyst on recent pelvic ultrasound.    PERITONEUM/MESENTERY/BOWEL: No evidence of bowel obstruction or free air.   Normal appearing appendix. Small volume abdominopelvic ascites.    BONES/SOFT TISSUES: No acute osseous abnormalities.      IMPRESSION:    Small volume abdominopelvic ascites, may reflect ruptured ovarian cyst.    Right adnexal hemorrhagic cyst (better characterized on recent pelvic   ultrasound) measuring 4.7 x 3.5 cm.    ******PRELIMINARY REPORT******      ******PRELIMINARY REPORT******     ALKA LOCO MD; Resident Radiologist    < end of copied text >          < from: US Transvaginal (22 @ 21:41) >    ACC: [id number] EXAM:  US TRANSVAGINAL                          PROCEDURE DATE:  2022      INTERPRETATION:  CLINICAL INFORMATION: Right lower quadrant pain    LMP: 2022    COMPARISON: None available.    TECHNIQUE:  Endovaginal and transabdominal pelvic sonogram. Color and Spectral   Doppler was performed.    FINDINGS:    Uterus: 7.2 cm x 2.9 cm x 3.6 cm. Within normal limits.  Endometrium: 8 mm. Within normal limits.    Right ovary: 5.4 cm x 4.3 cm x 4.2 cm. Volume approximating 50.7 mL.   There is a 4.3 cm right ovarian cystic lesion with internal lacelike   architecture and no appreciable flow compatible with a hemorrhagic cyst.   Doppler flow demonstrated to the right ovary  Left ovary: 3.2 cm x 1.9 cm x 2.8 cm. Within normal limits. Doppler flow   is symmetric to the left ovary    Fluid: Free fluid is noted in the pelvis as well as trace free fluid in   the abdomen.    IMPRESSION:    Enlarged right ovary with a 4.3 cm hemorrhagic cyst.  Free fluid in the pelvis as wellas trace free fluid in the abdomen.    --- End of Report ---    AUNG ALEXIS MD; Attending Radiologist  This document has been electronically signed. Mar 24 2022 10:35PM    < end of copied text >

## 2022-03-25 NOTE — BRIEF OPERATIVE NOTE - NSICDXBRIEFPREOP_GEN_ALL_CORE_FT
PRE-OP DIAGNOSIS:  Right ovarian cyst 25-Mar-2022 19:56:58  Florence Ma  Right lower quadrant pain 25-Mar-2022 19:58:10  Florence Ma

## 2022-03-25 NOTE — CHART NOTE - NSCHARTNOTEFT_GEN_A_CORE
PACU ANESTHESIA PACU ADMISSION NOTE      Procedure:Diagnostic laparoscopy    Laparoscopic right ovarian cystectomy    Evacuation of hemoperitoneum      Post op diagnosisRight ovarian cyst        ____ Intubated  TV:______       Rate: ______      FiO2: ______    _x___ Patent Airway    __x__ Full return of protective reflexes    ____ Full recovery from anesthesia / sedation to baseline status    Viitals:  see anesthesia record          Mental Status:  ____ Awake   __x___ Alert   _____ Drowsy   _____ Sedated    Nausea/Vomiting: ____ Yes, See Post - Op Orders      _x___ No    Pain Scale (0-10): _____    Treatment: ____ None    x____ See Post - Op/PCA Orders    Post - Operative Fluids:   ____ Oral   __x__ See Post - Op Orders    Plan:       x__Floor         _____Critical Care    _____Other:_________________    Comments: uneventful perioperative course; no s/s of anesthesia complications noted, D/C floor when criteria met

## 2022-03-25 NOTE — BRIEF OPERATIVE NOTE - NSICDXBRIEFPROCEDURE_GEN_ALL_CORE_FT
PROCEDURES:  Diagnostic laparoscopy 25-Mar-2022 19:56:13  Florence Ma  Laparoscopic right ovarian cystectomy 25-Mar-2022 19:56:41  Florence Ma  Evacuation of hemoperitoneum 25-Mar-2022 19:56:48  Florence Ma

## 2022-03-25 NOTE — H&P ADULT - HISTORY OF PRESENT ILLNESS
25yo G0 LMP 22 presents to the ED with right lower quadrant pain, GYN consulted for R ovarian cyst noted on imaging. For the last 10 days, patient has experienced intermittent sharp right lower quadrant. But at 1730, after intercourse with her , started to experience 10/10 sharp RLQ pain, associated with nausea. Pt reports she was crawled up into a ball on the floor, crying, due to the severity of the pain. Not radiating, worsened by movement. Tried PO meds at home, did not alleviate symptoms. In the ED received morphine three times, did not alleviate symptoms. Just received Dilaudid prior to GYN evaluation, pain now 9/10 intensity. Denies HA, CP, SOB, vomiting, fevers, chills, vaginal bleeding, urinary symptoms, changes in bowel habits. Last PO intake 3/24 at 1400.     Ob/Gyn History:                   LMP - 22                  Cycle Length - q30 days, lasting 3-5 days  Denies history of ovarian cysts, uterine fibroids, abnormal paps, or STIs    Pt is s/p ED observation: Received IVF, zofran, morphine and dilaudid. Seen at bedside, she reports pain decreased from 10/10 to 8/10. Continues to endorse nausea, bloating, and radiation of pain up right abdomen/torso to right shoulder. Repeat CBC with leukocytosis 11.8 -->18.2, Hgb 12.7 --> 11.4. To admit patient to GYN service for management of pain and ruptured hemorrhagic cyst.

## 2022-03-25 NOTE — CONSULT NOTE ADULT - ASSESSMENT
27yo G0 with no significant PMHx, with sudden onset RLQ pain, right hemorrhagic cyst 4.3cm and free fluid in pelvis/abdomen on imaging, otherwise clinically and hemodynamically stable.   - Discussed with patient that the pain is most likely from a ruptured hemorrhagic cyst considering the free fluid, however ovarian torsion cannot be completely ruled out at this time  - Recommend repeat CBC and IV fluids  - Patient is s/p morphine x3 in the ED, to re-evaluate pain after Dilaudid  - Continue NPO status  - F/u final read of CT  - No acute GYN intervention at this time, however will reevaluate after above recommendations    Dr Ayala and Dr Zee aware.    A/P: 25yo G0, with RLQ pain, right hemorrhagic cyst 4.3cm and free fluid in pelvis/abdomen on imaging, likely secondary to ruptured hemorrhagic cyst, otherwise clinically and hemodynamically stable.   - Discussed with patient that the pain is most likely from a ruptured hemorrhagic cyst considering the free fluid, however ovarian torsion cannot be completely ruled out at this time  - Recommend repeat CBC and IV fluids  - Patient is s/p morphine x3 in the ED, to re-evaluate pain after Dilaudid  - Continue NPO status  - F/u final read of CT  - No acute GYN intervention at this time, however will reevaluate after above recommendations    Dr Ayala and Dr Zee aware.

## 2022-03-25 NOTE — PRE-ANESTHESIA EVALUATION ADULT - NSPROPOSEDPROCEDFT_GEN_ALL_CORE
Laparoscopic right ovarian cystectomy Diagnostic Laparoscopic possible right/ left  ovarian cystectomy

## 2022-03-25 NOTE — H&P ADULT - ATTENDING COMMENTS
IMP: RLQ pain, Likely leaking/ruptured Right Hemorrhagic Cyst    Plan: Admit, Serial CBC and Serial Abdominal Exams, NPO, IVF, if pain persists or worsens will consider laparoscopy

## 2022-03-25 NOTE — PATIENT PROFILE ADULT - FALL HARM RISK - HARM RISK INTERVENTIONS

## 2022-03-26 ENCOUNTER — TRANSCRIPTION ENCOUNTER (OUTPATIENT)
Age: 27
End: 2022-03-26

## 2022-03-26 VITALS
SYSTOLIC BLOOD PRESSURE: 115 MMHG | HEART RATE: 92 BPM | TEMPERATURE: 98 F | DIASTOLIC BLOOD PRESSURE: 80 MMHG | RESPIRATION RATE: 18 BRPM | OXYGEN SATURATION: 98 %

## 2022-03-26 RX ORDER — OXYCODONE HYDROCHLORIDE 5 MG/1
1 TABLET ORAL
Qty: 8 | Refills: 0
Start: 2022-03-26 | End: 2022-03-27

## 2022-03-26 RX ORDER — ACETAMINOPHEN 500 MG
2 TABLET ORAL
Qty: 40 | Refills: 0
Start: 2022-03-26 | End: 2022-03-30

## 2022-03-26 RX ORDER — IBUPROFEN 200 MG
1 TABLET ORAL
Qty: 20 | Refills: 0
Start: 2022-03-26 | End: 2022-03-30

## 2022-03-26 RX ORDER — ACETAMINOPHEN 500 MG
3 TABLET ORAL
Qty: 60 | Refills: 0
Start: 2022-03-26 | End: 2022-03-30

## 2022-03-26 RX ORDER — ONDANSETRON 8 MG/1
1 TABLET, FILM COATED ORAL
Qty: 10 | Refills: 0
Start: 2022-03-26

## 2022-03-26 RX ADMIN — Medication 600 MILLIGRAM(S): at 06:00

## 2022-03-26 RX ADMIN — Medication 975 MILLIGRAM(S): at 06:00

## 2022-03-26 RX ADMIN — Medication 975 MILLIGRAM(S): at 11:27

## 2022-03-26 RX ADMIN — OXYCODONE HYDROCHLORIDE 5 MILLIGRAM(S): 5 TABLET ORAL at 04:48

## 2022-03-26 RX ADMIN — Medication 600 MILLIGRAM(S): at 11:27

## 2022-03-26 RX ADMIN — OXYCODONE HYDROCHLORIDE 5 MILLIGRAM(S): 5 TABLET ORAL at 14:04

## 2022-03-26 NOTE — DISCHARGE NOTE NURSING/CASE MANAGEMENT/SOCIAL WORK - PATIENT PORTAL LINK FT
You can access the FollowMyHealth Patient Portal offered by [facility] by registering at the following website: [email or web address]. By joining [employer]’s FollowMyHealth portal, you will also be able to view your health information using other applications (apps) compatible with our system.

## 2022-03-26 NOTE — DISCHARGE NOTE PROVIDER - NSDCMRMEDTOKEN_GEN_ALL_CORE_FT
acetaminophen 325 mg oral tablet: 2 tab(s) orally every 6 hours, As Needed  -for moderate pain - for mild pain   ibuprofen 600 mg oral tablet: 1 tab(s) orally every 6 hours, As Needed -for moderate pain   ondansetron 4 mg oral tablet, disintegratin tab(s) orally every 8 hours, As Needed

## 2022-03-26 NOTE — DISCHARGE NOTE PROVIDER - NSDCCPTREATMENT_GEN_ALL_CORE_FT
PRINCIPAL PROCEDURE  Procedure: Laparoscopic right ovarian cystectomy  Findings and Treatment:       SECONDARY PROCEDURE  Procedure: Diagnostic laparoscopy  Findings and Treatment:     Procedure: Evacuation of hemoperitoneum  Findings and Treatment:

## 2022-03-26 NOTE — PROGRESS NOTE ADULT - SUBJECTIVE AND OBJECTIVE BOX
GYN Progress Note    HPI: No overnight events, no acute complaints. Pt doing well, pain well controlled - reports pain now incisional (mostly umbilical) and right shoulder.  Tolerating regular diet without N/V.  Not yet passing flatus.  Ambulating OOB independently. Voiding without issue.     24H Pain Meds (after PACU)  Acetaminophen 3950mg  Ibuprofen 1200g  Oxycodone 10mg      ROS: Denies cardiovascular or respiratory symptoms    PAST MEDICAL & SURGICAL HISTORY:  No pertinent past medical history  History of tonsillectomy  History of umbilical hernia repair 3y old        Physical Exam  Vital Signs Last 24 Hrs  T(F): 97.9 (26 Mar 2022 05:12), Max: 98.5 (25 Mar 2022 19:50)  HR: 109 (26 Mar 2022 05:12) (80 - 110)  BP: 115/55 (26 Mar 2022 05:12) (109/53 - 129/73)  RR: 18 (26 Mar 2022 05:12) (15 - 18)    Physical exam:  General - AAOx3, NAD  Heart - S1S2, RRR, manual HR 92bpm  Lungs - CTA BL  Abdomen:  - Soft, appropriately TTP near incisions without rebound/guarding, nondistended, BS+  - Clean, dry, intact gauze/tegaderm dressing in place over lsc incisions x 3, 2x3cm ecchymosis inferior to umbilicus  Pelvis/Vagina - No bleeding  Extremities - No calf tenderness, no swelling, SCDs in place    Labs:                        10.3   13.28 )-----------( 356      ( 25 Mar 2022 11:00 )             31.2                         11.4   18.29 )-----------( 388      ( 25 Mar 2022 03:00 )             35.9     03-24    140  |  105  |  9<L>  ----------------------------<  130<H>  4.3   |  23  |  0.6<L>    Ca    9.5      24 Mar 2022 19:39    TPro  6.6  /  Alb  4.4  /  TBili  <0.2  /  DBili  x   /  AST  14  /  ALT  16  /  AlkPhos  82  03-24           
Pt seen at bedside, continues to complain of significant RLQ pain, despite pain management. desires surgical intervention for likely ruptured right hemorraghic cyst    VSS - Afebrile    Abd - soft, + RLQ tenderness, no rebound, mild guarding, BS+

## 2022-03-26 NOTE — DISCHARGE NOTE PROVIDER - HOSPITAL COURSE
Patient admitted for pain control, imaging findings of right ovarian cyst, ruptured. S/p laparoscopic ovarian right cystectomy with evacuation of 200cc hemoperitoneum. Postop course unremarkable. Patient admitted for pain control, imaging findings of right ovarian cyst, ruptured. S/p laparoscopic ovarian right cystectomy with evacuation of 200cc hemoperitoneum on 3/25/2022. Postop course unremarkable. By day of discharge she was meeting all postoperative milestones - tolerating regular diet, ambulating independently, pain well controlled on PO medications, voiding, with return of bowel function.

## 2022-03-26 NOTE — PROGRESS NOTE ADULT - ASSESSMENT
IMP: RLQ pain, Hemorraghic Right Ovarian Cyst with probable hemoperitoneum    Plan: Pt on call to OR for Laproscopic right ovarian cystectomy - Consent Obtained - R/B/A/P jone pt
25yo G0 LMP 2/25/22 who presented 3/24/22 with RLQ pain found to have right likely hemorrhagic cyst and free fluid in pelvis/abdomen on imaging, now POD#1 s/p right ovarian cystectomy, evacuation of hemoperitoneum, EBL 200cc, recovering appropriately  - acute blood loss anemia d/t ruptured hemorrhagic cyst, most recent H/H 10.3/31.2  - regular diet, SLIV  - encourage ambulation  - encourage incentive spirometry use  - vital signs q4 hours  - DVT ppx: ambulate as tolerated, SCDs    Patient to ambulate, awaiting flatus.  Likely discharge this afternoon. Reviewed postop care and expectations.     Discussed with attending Dr. Zee

## 2022-03-26 NOTE — DISCHARGE NOTE PROVIDER - CARE PROVIDER_API CALL
Varinder Zee)  Obstetrics and Gynecology  [address] [place] [address]  Phone: [phone]  Fax: [phone]  Follow Up Time: 2 weeks

## 2022-03-26 NOTE — DISCHARGE NOTE PROVIDER - NSDCFUADDINST_GEN_ALL_CORE_FT
Remove surgical incision dressing in 48 hours.  DIET  - You may resume your normal diet. Eat a well-balanced diet. You may prefer to eat light meals for the first few days after surgery.  Drink plenty of water (6-8 glasses a day).    - Please take Senna (stool softener) daily until you have normal bowel movements.  If you have constipation or don't have a bowel movement 2-3 days after surgery, please try a mild laxative such as Miralax.   - You may take zofran as needed for nausea (this dissolves under your tongue).     ACTIVITY:   - No heavy lifting/pushing/pulling for 4 weeks. Do not lift anything more than 10 lbs (such as laundry, groceries, children, pets), vacuum, push heavy doors or grocery carts, etc, for 6 weeks.  - You may climb stairs as tolerated.  -  Do not put anything in the vagina for at least 4 weeks after surgery unless otherwise instructed by your doctor (including tampons, douching, sexual intercourse, etc).  -  No driving for 1 week after surgery and not while taking narcotic pain medication. Drive defensively when you are ready.  -  Avoid sitting or lying in bed for more than 2 hours at a time while you are awake to reduce your risk of blood clots.    WOUND CARE: You have 3 incisions that are covered with surgical glue (Dermabond).  After 24 hours please remove tegaderm/gauze dressings and you may get your incisions wet.  Do not submerge in water (no tub baths or pools), showering is OK.  Do not apply any ointments, lotions, creams or tape.    PAIN MANAGEMENT:   Alternate Tylenol and ibuprofen/Motrin (if you are eligible). Each of these medications can be taken every six hours. Try to stagger them so that you are taking something for pain every three hours (ex. Take Motrin at 12:00, Tylenol at 3:00, Motrin at 6:00, etc.) to maximize pain relief.  - Tylenol – 650mg every 6 hours as needed. The maximum dose of Tylenol is 3000 mg in 24 hours.  - Motrin/Ibuprofen - 600 mg every 6 hours as needed (try to take with food). The maximum dose of Motrin/ibuprofen is 2400mg in 24 hours  - Oxycodone 5mg every 6 hours as needed for severe pain (limit use as this is a narcotic and can cause sedation, nausea and constipation.  -  A warm shower, heating pad, and/or walking may help.    WHAT TO EXPECT AT HOME  - Recovery from surgery is generally 2-4 weeks, but sometimes longer for more strenuous activity. It is normal to be very tired during this time.  - It is normal to have some drainage or a small amount of vaginal bleeding after surgery that would require the use of a light pantiliner. This discharge may last up to 6 weeks. The bleeding and discharge should be light and should have no odor.  - You may experience gas pain, abdominal swelling, or shoulder pain for 24-72 hours after surgery. This is from the carbon dioxide gas put into your abdomen to better visualize your organs. A warm shower, heating pad, and/or walking may help.    WHEN TO CALL YOUR DOCTOR:  - Fever (>100.4°F or 38.0°C) or chills  - Incision problems such as redness, warmth, swelling, or foul smelling drainage.  - Severe nausea or persistent vomiting.  - Bright red vaginal bleeding (soaking >1 pad/hour) or foul smelling vaginal drainage.  - Severe pain not relieved with pain medication.  - Pain with urination, cloudy urine, or foul smelling urine.  - Or if you have any other problems or questions.

## 2022-03-26 NOTE — DISCHARGE NOTE NURSING/CASE MANAGEMENT/SOCIAL WORK - NSDCPEFALRISK_GEN_ALL_CORE
For information on Fall & Injury Prevention, visit: [email or web address] OR  [email or web address] OR  https://www.cdc.gov/steadi/patient.html

## 2022-03-26 NOTE — PROGRESS NOTE ADULT - ATTENDING COMMENTS
Pt seen at bedside, feels much better, ambulating and tolerating diet    IMP: s/p Lapro  Right Ovarian Cystectomy, Evacuation of Hemoperitoneum - POD # 1 - Doing Well    Plan: dc home, NSAID's/Tylenol, Remove dressings in 24 hrs, f/u office - 10 days - wound check

## 2022-03-29 DIAGNOSIS — N83.201 UNSPECIFIED OVARIAN CYST, RIGHT SIDE: ICD-10-CM

## 2022-03-29 DIAGNOSIS — D62 ACUTE POSTHEMORRHAGIC ANEMIA: ICD-10-CM

## 2022-03-29 DIAGNOSIS — K66.1 HEMOPERITONEUM: ICD-10-CM

## 2022-03-29 DIAGNOSIS — Z88.0 ALLERGY STATUS TO PENICILLIN: ICD-10-CM

## 2022-03-29 LAB — SURGICAL PATHOLOGY STUDY: SIGNIFICANT CHANGE UP

## 2022-12-14 ENCOUNTER — OUTPATIENT (OUTPATIENT)
Dept: OUTPATIENT SERVICES | Facility: HOSPITAL | Age: 27
LOS: 1 days | Discharge: HOME | End: 2022-12-14

## 2022-12-14 DIAGNOSIS — Z90.89 ACQUIRED ABSENCE OF OTHER ORGANS: Chronic | ICD-10-CM

## 2022-12-14 DIAGNOSIS — Z98.890 OTHER SPECIFIED POSTPROCEDURAL STATES: Chronic | ICD-10-CM

## 2022-12-14 DIAGNOSIS — R10.2 PELVIC AND PERINEAL PAIN: ICD-10-CM

## 2022-12-14 PROCEDURE — 76830 TRANSVAGINAL US NON-OB: CPT | Mod: 26

## 2023-05-31 NOTE — ED PROVIDER NOTE - IV ALTEPLASE INCLUSION HIDDEN
show
[facility]house, Joseph Cameron  Podiatric Medicine and Surgery  [address] [place] [address]  Phone: [phone]  Fax: [phone]  Follow Up Time:     Bonnie Elena  Internal Medicine  [address] [facility], [address]  [place] [address]  Phone: [phone]  Fax: [phone]  Follow Up Time:     JOSE CARLOS PIERCE  [address] [address]  Phone: [phone]  Fax: [phone]  Follow Up Time:

## 2023-11-08 ENCOUNTER — INPATIENT (INPATIENT)
Facility: HOSPITAL | Age: 28
LOS: 1 days | Discharge: HOME | DRG: 645 | End: 2023-11-10
Attending: INTERNAL MEDICINE | Admitting: INTERNAL MEDICINE
Payer: COMMERCIAL

## 2023-11-08 VITALS
DIASTOLIC BLOOD PRESSURE: 92 MMHG | RESPIRATION RATE: 16 BRPM | HEART RATE: 129 BPM | SYSTOLIC BLOOD PRESSURE: 163 MMHG | OXYGEN SATURATION: 98 % | TEMPERATURE: 98 F | WEIGHT: 184.97 LBS

## 2023-11-08 DIAGNOSIS — R00.0 TACHYCARDIA, UNSPECIFIED: ICD-10-CM

## 2023-11-08 DIAGNOSIS — Z90.89 ACQUIRED ABSENCE OF OTHER ORGANS: Chronic | ICD-10-CM

## 2023-11-08 DIAGNOSIS — Z98.890 OTHER SPECIFIED POSTPROCEDURAL STATES: Chronic | ICD-10-CM

## 2023-11-08 LAB
ALBUMIN SERPL ELPH-MCNC: 4.1 G/DL — SIGNIFICANT CHANGE UP (ref 3.5–5.2)
ALBUMIN SERPL ELPH-MCNC: 4.1 G/DL — SIGNIFICANT CHANGE UP (ref 3.5–5.2)
ALP SERPL-CCNC: 65 U/L — SIGNIFICANT CHANGE UP (ref 30–115)
ALP SERPL-CCNC: 65 U/L — SIGNIFICANT CHANGE UP (ref 30–115)
ALT FLD-CCNC: 24 U/L — SIGNIFICANT CHANGE UP (ref 0–41)
ALT FLD-CCNC: 24 U/L — SIGNIFICANT CHANGE UP (ref 0–41)
ANION GAP SERPL CALC-SCNC: 13 MMOL/L — SIGNIFICANT CHANGE UP (ref 7–14)
ANION GAP SERPL CALC-SCNC: 13 MMOL/L — SIGNIFICANT CHANGE UP (ref 7–14)
APTT BLD: 33.8 SEC — SIGNIFICANT CHANGE UP (ref 27–39.2)
APTT BLD: 33.8 SEC — SIGNIFICANT CHANGE UP (ref 27–39.2)
AST SERPL-CCNC: 19 U/L — SIGNIFICANT CHANGE UP (ref 0–41)
AST SERPL-CCNC: 19 U/L — SIGNIFICANT CHANGE UP (ref 0–41)
BASOPHILS # BLD AUTO: 0.02 K/UL — SIGNIFICANT CHANGE UP (ref 0–0.2)
BASOPHILS # BLD AUTO: 0.02 K/UL — SIGNIFICANT CHANGE UP (ref 0–0.2)
BASOPHILS NFR BLD AUTO: 0.4 % — SIGNIFICANT CHANGE UP (ref 0–1)
BASOPHILS NFR BLD AUTO: 0.4 % — SIGNIFICANT CHANGE UP (ref 0–1)
BILIRUB SERPL-MCNC: <0.2 MG/DL — SIGNIFICANT CHANGE UP (ref 0.2–1.2)
BILIRUB SERPL-MCNC: <0.2 MG/DL — SIGNIFICANT CHANGE UP (ref 0.2–1.2)
BUN SERPL-MCNC: 13 MG/DL — SIGNIFICANT CHANGE UP (ref 10–20)
BUN SERPL-MCNC: 13 MG/DL — SIGNIFICANT CHANGE UP (ref 10–20)
CALCIUM SERPL-MCNC: 9.2 MG/DL — SIGNIFICANT CHANGE UP (ref 8.4–10.5)
CALCIUM SERPL-MCNC: 9.2 MG/DL — SIGNIFICANT CHANGE UP (ref 8.4–10.5)
CHLORIDE SERPL-SCNC: 107 MMOL/L — SIGNIFICANT CHANGE UP (ref 98–110)
CHLORIDE SERPL-SCNC: 107 MMOL/L — SIGNIFICANT CHANGE UP (ref 98–110)
CO2 SERPL-SCNC: 20 MMOL/L — SIGNIFICANT CHANGE UP (ref 17–32)
CO2 SERPL-SCNC: 20 MMOL/L — SIGNIFICANT CHANGE UP (ref 17–32)
CREAT SERPL-MCNC: 0.5 MG/DL — LOW (ref 0.7–1.5)
CREAT SERPL-MCNC: 0.5 MG/DL — LOW (ref 0.7–1.5)
D DIMER BLD IA.RAPID-MCNC: 242 NG/ML DDU — HIGH
D DIMER BLD IA.RAPID-MCNC: 242 NG/ML DDU — HIGH
EGFR: 131 ML/MIN/1.73M2 — SIGNIFICANT CHANGE UP
EGFR: 131 ML/MIN/1.73M2 — SIGNIFICANT CHANGE UP
EOSINOPHIL # BLD AUTO: 0.01 K/UL — SIGNIFICANT CHANGE UP (ref 0–0.7)
EOSINOPHIL # BLD AUTO: 0.01 K/UL — SIGNIFICANT CHANGE UP (ref 0–0.7)
EOSINOPHIL NFR BLD AUTO: 0.2 % — SIGNIFICANT CHANGE UP (ref 0–8)
EOSINOPHIL NFR BLD AUTO: 0.2 % — SIGNIFICANT CHANGE UP (ref 0–8)
GLUCOSE SERPL-MCNC: 108 MG/DL — HIGH (ref 70–99)
GLUCOSE SERPL-MCNC: 108 MG/DL — HIGH (ref 70–99)
HCG SERPL QL: NEGATIVE — SIGNIFICANT CHANGE UP
HCG SERPL QL: NEGATIVE — SIGNIFICANT CHANGE UP
HCT VFR BLD CALC: 35.9 % — LOW (ref 37–47)
HCT VFR BLD CALC: 35.9 % — LOW (ref 37–47)
HGB BLD-MCNC: 11.9 G/DL — LOW (ref 12–16)
HGB BLD-MCNC: 11.9 G/DL — LOW (ref 12–16)
IMM GRANULOCYTES NFR BLD AUTO: 0.2 % — SIGNIFICANT CHANGE UP (ref 0.1–0.3)
IMM GRANULOCYTES NFR BLD AUTO: 0.2 % — SIGNIFICANT CHANGE UP (ref 0.1–0.3)
INR BLD: 1.06 RATIO — SIGNIFICANT CHANGE UP (ref 0.65–1.3)
INR BLD: 1.06 RATIO — SIGNIFICANT CHANGE UP (ref 0.65–1.3)
LYMPHOCYTES # BLD AUTO: 1.85 K/UL — SIGNIFICANT CHANGE UP (ref 1.2–3.4)
LYMPHOCYTES # BLD AUTO: 1.85 K/UL — SIGNIFICANT CHANGE UP (ref 1.2–3.4)
LYMPHOCYTES # BLD AUTO: 35.4 % — SIGNIFICANT CHANGE UP (ref 20.5–51.1)
LYMPHOCYTES # BLD AUTO: 35.4 % — SIGNIFICANT CHANGE UP (ref 20.5–51.1)
MAGNESIUM SERPL-MCNC: 1.8 MG/DL — SIGNIFICANT CHANGE UP (ref 1.8–2.4)
MAGNESIUM SERPL-MCNC: 1.8 MG/DL — SIGNIFICANT CHANGE UP (ref 1.8–2.4)
MCHC RBC-ENTMCNC: 27.9 PG — SIGNIFICANT CHANGE UP (ref 27–31)
MCHC RBC-ENTMCNC: 27.9 PG — SIGNIFICANT CHANGE UP (ref 27–31)
MCHC RBC-ENTMCNC: 33.1 G/DL — SIGNIFICANT CHANGE UP (ref 32–37)
MCHC RBC-ENTMCNC: 33.1 G/DL — SIGNIFICANT CHANGE UP (ref 32–37)
MCV RBC AUTO: 84.3 FL — SIGNIFICANT CHANGE UP (ref 81–99)
MCV RBC AUTO: 84.3 FL — SIGNIFICANT CHANGE UP (ref 81–99)
MONOCYTES # BLD AUTO: 0.78 K/UL — HIGH (ref 0.1–0.6)
MONOCYTES # BLD AUTO: 0.78 K/UL — HIGH (ref 0.1–0.6)
MONOCYTES NFR BLD AUTO: 14.9 % — HIGH (ref 1.7–9.3)
MONOCYTES NFR BLD AUTO: 14.9 % — HIGH (ref 1.7–9.3)
NEUTROPHILS # BLD AUTO: 2.56 K/UL — SIGNIFICANT CHANGE UP (ref 1.4–6.5)
NEUTROPHILS # BLD AUTO: 2.56 K/UL — SIGNIFICANT CHANGE UP (ref 1.4–6.5)
NEUTROPHILS NFR BLD AUTO: 48.9 % — SIGNIFICANT CHANGE UP (ref 42.2–75.2)
NEUTROPHILS NFR BLD AUTO: 48.9 % — SIGNIFICANT CHANGE UP (ref 42.2–75.2)
NRBC # BLD: 0 /100 WBCS — SIGNIFICANT CHANGE UP (ref 0–0)
NRBC # BLD: 0 /100 WBCS — SIGNIFICANT CHANGE UP (ref 0–0)
NT-PROBNP SERPL-SCNC: 112 PG/ML — SIGNIFICANT CHANGE UP (ref 0–300)
NT-PROBNP SERPL-SCNC: 112 PG/ML — SIGNIFICANT CHANGE UP (ref 0–300)
PLATELET # BLD AUTO: 318 K/UL — SIGNIFICANT CHANGE UP (ref 130–400)
PLATELET # BLD AUTO: 318 K/UL — SIGNIFICANT CHANGE UP (ref 130–400)
PMV BLD: 10 FL — SIGNIFICANT CHANGE UP (ref 7.4–10.4)
PMV BLD: 10 FL — SIGNIFICANT CHANGE UP (ref 7.4–10.4)
POTASSIUM SERPL-MCNC: 4.8 MMOL/L — SIGNIFICANT CHANGE UP (ref 3.5–5)
POTASSIUM SERPL-MCNC: 4.8 MMOL/L — SIGNIFICANT CHANGE UP (ref 3.5–5)
POTASSIUM SERPL-SCNC: 4.8 MMOL/L — SIGNIFICANT CHANGE UP (ref 3.5–5)
POTASSIUM SERPL-SCNC: 4.8 MMOL/L — SIGNIFICANT CHANGE UP (ref 3.5–5)
PROT SERPL-MCNC: 6.3 G/DL — SIGNIFICANT CHANGE UP (ref 6–8)
PROT SERPL-MCNC: 6.3 G/DL — SIGNIFICANT CHANGE UP (ref 6–8)
PROTHROM AB SERPL-ACNC: 12.1 SEC — SIGNIFICANT CHANGE UP (ref 9.95–12.87)
PROTHROM AB SERPL-ACNC: 12.1 SEC — SIGNIFICANT CHANGE UP (ref 9.95–12.87)
RBC # BLD: 4.26 M/UL — SIGNIFICANT CHANGE UP (ref 4.2–5.4)
RBC # BLD: 4.26 M/UL — SIGNIFICANT CHANGE UP (ref 4.2–5.4)
RBC # FLD: 12.3 % — SIGNIFICANT CHANGE UP (ref 11.5–14.5)
RBC # FLD: 12.3 % — SIGNIFICANT CHANGE UP (ref 11.5–14.5)
SODIUM SERPL-SCNC: 140 MMOL/L — SIGNIFICANT CHANGE UP (ref 135–146)
SODIUM SERPL-SCNC: 140 MMOL/L — SIGNIFICANT CHANGE UP (ref 135–146)
TROPONIN T SERPL-MCNC: <0.01 NG/ML — SIGNIFICANT CHANGE UP
TROPONIN T SERPL-MCNC: <0.01 NG/ML — SIGNIFICANT CHANGE UP
WBC # BLD: 5.23 K/UL — SIGNIFICANT CHANGE UP (ref 4.8–10.8)
WBC # BLD: 5.23 K/UL — SIGNIFICANT CHANGE UP (ref 4.8–10.8)
WBC # FLD AUTO: 5.23 K/UL — SIGNIFICANT CHANGE UP (ref 4.8–10.8)
WBC # FLD AUTO: 5.23 K/UL — SIGNIFICANT CHANGE UP (ref 4.8–10.8)

## 2023-11-08 PROCEDURE — 36415 COLL VENOUS BLD VENIPUNCTURE: CPT

## 2023-11-08 PROCEDURE — 84436 ASSAY OF TOTAL THYROXINE: CPT

## 2023-11-08 PROCEDURE — 71045 X-RAY EXAM CHEST 1 VIEW: CPT | Mod: 26

## 2023-11-08 PROCEDURE — 99285 EMERGENCY DEPT VISIT HI MDM: CPT

## 2023-11-08 PROCEDURE — 84439 ASSAY OF FREE THYROXINE: CPT

## 2023-11-08 PROCEDURE — 84445 ASSAY OF TSI GLOBULIN: CPT

## 2023-11-08 PROCEDURE — 0225U NFCT DS DNA&RNA 21 SARSCOV2: CPT

## 2023-11-08 PROCEDURE — 80053 COMPREHEN METABOLIC PANEL: CPT

## 2023-11-08 PROCEDURE — 84480 ASSAY TRIIODOTHYRONINE (T3): CPT

## 2023-11-08 PROCEDURE — 93010 ELECTROCARDIOGRAM REPORT: CPT

## 2023-11-08 PROCEDURE — 85025 COMPLETE CBC W/AUTO DIFF WBC: CPT

## 2023-11-08 PROCEDURE — 84443 ASSAY THYROID STIM HORMONE: CPT

## 2023-11-08 PROCEDURE — 86800 THYROGLOBULIN ANTIBODY: CPT

## 2023-11-08 PROCEDURE — 93970 EXTREMITY STUDY: CPT | Mod: 26

## 2023-11-08 PROCEDURE — 83735 ASSAY OF MAGNESIUM: CPT

## 2023-11-08 PROCEDURE — 71275 CT ANGIOGRAPHY CHEST: CPT | Mod: 26,MA

## 2023-11-08 RX ORDER — SODIUM CHLORIDE 9 MG/ML
1000 INJECTION INTRAMUSCULAR; INTRAVENOUS; SUBCUTANEOUS ONCE
Refills: 0 | Status: COMPLETED | OUTPATIENT
Start: 2023-11-08 | End: 2023-11-08

## 2023-11-08 RX ORDER — ACETAMINOPHEN 500 MG
650 TABLET ORAL ONCE
Refills: 0 | Status: COMPLETED | OUTPATIENT
Start: 2023-11-08 | End: 2023-11-08

## 2023-11-08 RX ORDER — METOPROLOL TARTRATE 50 MG
12.5 TABLET ORAL ONCE
Refills: 0 | Status: COMPLETED | OUTPATIENT
Start: 2023-11-08 | End: 2023-11-08

## 2023-11-08 RX ADMIN — Medication 650 MILLIGRAM(S): at 20:07

## 2023-11-08 RX ADMIN — Medication 650 MILLIGRAM(S): at 21:58

## 2023-11-08 RX ADMIN — Medication 12.5 MILLIGRAM(S): at 21:05

## 2023-11-08 RX ADMIN — SODIUM CHLORIDE 2000 MILLILITER(S): 9 INJECTION INTRAMUSCULAR; INTRAVENOUS; SUBCUTANEOUS at 16:22

## 2023-11-08 NOTE — ED ADULT NURSE REASSESSMENT NOTE - NS ED NURSE REASSESS COMMENT FT1
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

## 2023-11-08 NOTE — ED ADULT TRIAGE NOTE - CHIEF COMPLAINT QUOTE
sent from urgent care for tachycardia, went to urgent care cause shes been feeling weak, sob and dizzy for 1 week

## 2023-11-08 NOTE — ED PROVIDER NOTE - CONSIDERATION OF ADMISSION OBSERVATION
Patient had screening exam, labs and imaging in the ED with supportive care.  Minimal change in her resting tachycardia with therapy in the ED.  Will admit for continued management monitoring and further work-up. Consideration of Admission/Observation

## 2023-11-08 NOTE — H&P ADULT - HISTORY OF PRESENT ILLNESS
· HPI Objective Statement: 28-year-old female with no significant past medical history who presented to ED with lightheadedness, general weakness, shortness of breath, nausea.  Reports that symptoms started 2 weeks ago; symptoms are intermittent and there is no alleviating or worsening factor. Denies fever, chest pain, vomiting, abdominal pain, urinary symptoms, change with bowel movement   28-year-old female with no significant past medical history who presented to ED with lightheadedness, general weakness, shortness of breath, and muscle aches.  Reports that symptoms started 2 weeks ago and her palpitations/racing heart began this past Monday. Pt was seen by her PCP and was found to be tachycardic into the 120s. When walking her HR increased to the 150s.  Pt's symptoms of lightheadedness are intermittent and generally occur w/ standing. Denies fever, chest pain, vomiting, abdominal pain, urinary symptoms, change with bowel movement. Pt has been feeling sweaty and "shaky" but her body temperature has not been elevated. Pt assessed her blood glucose and it was wnl.     VS in the ED were 163/92 mm Hg BP, 129 HR, 16 RR, 98.4 degrees F, 98% SpO2.   Labs were remarkable for slightly elevated d-dimer of 242.     CTA chest PE protocol performed and no PE was demonstrated. B/L LE duplex was performed and preliminary read did not identify existence of any DVTs. Patient remains with a resting tachycardia at 130 bedside, regular, unchanged with fluid administration (1L bolus) in the ED,  Additional historical items are identified, patient has no known autoimmune syndromes/endocrinopathies or FH of hypo/hyperthyroidism but admits to weight gain of late as well as profound fatigue. Pt's muscle aches have decreased and are currently limited to her calves. She lacks URI sx, including cough, post-nasal drip, sore throat. Pt has been having moderate HAs w/o photophobia/phonophobia. These HAs are tension-like in character, w/ pressure above her eyebrows.       · EKG Date/Time: 08-Nov-2023 15:58  · Rate: 122  · Interpretation: abnormal  · Abnormal Rhythm: tachycardia  · IN: 136  · QRS: 76  · QTC: 413  · ST/T Wave: no ST changes noted

## 2023-11-08 NOTE — H&P ADULT - NSHPPHYSICALEXAM_GEN_ALL_CORE
PHYSICAL EXAM:  GENERAL: NAD, lying in bed comfortably  HEAD:  Atraumatic, Normocephalic  EYES: EOMI, PERRLA, conjunctiva and sclera clear  ENT: Moist mucous membranes  NECK: Supple, No JVD  CHEST/LUNG: Clear to auscultation bilaterally; No rales, rhonchi, wheezing, or rubs. Unlabored respirations  HEART: Regular rate and rhythm; No murmurs, rubs, or gallops  ABDOMEN: Bowel sounds present; Soft, Nontender, Nondistended. No hepatomegaly  EXTREMITIES:  2+ Peripheral Pulses, brisk capillary refill. No clubbing, cyanosis, or edema  NERVOUS SYSTEM:  Alert & Oriented X3, speech clear. No deficits   MSK: FROM all 4 extremities, full and equal strength  SKIN: No rashes or lesions PHYSICAL EXAM:  GENERAL: NAD, lying in bed comfortably  HEAD:  Atraumatic, Normocephalic  EYES: EOMI, PERRLA, conjunctiva and sclera clear  ENT: Moist mucous membranes  NECK: Supple, No JVD  CHEST/LUNG: Clear to auscultation bilaterally; No rales, rhonchi, wheezing, or rubs. Unlabored respirations  HEART: Pt tachycardic. Regular rhythm; No murmurs, rubs, or gallops  ABDOMEN: Bowel sounds present; Soft, Nontender, Nondistended. No hepatomegaly  EXTREMITIES:  2+ Peripheral Pulses, brisk capillary refill. No clubbing, cyanosis, or edema  NERVOUS SYSTEM:  Alert & Oriented X3, speech clear. No deficits   MSK: FROM all 4 extremities, full and equal strength  SKIN: No rashes or lesions

## 2023-11-08 NOTE — H&P ADULT - ASSESSMENT
28-year-old female with no significant past medical history who presented to ED with lightheadedness, general weakness, shortness of breath, muscle aches, and tachycardia.    #Postural Orthostatic Tachycardia Syndrome   #Possible viral illness-induced tachycardia  #Possible hyperthyroidism  -tachycardia unresponsive to 1 L bolus in ED  -sinus tachycardia seen on telemetry  -pt has fatigue, HAs. is female, premenopausal, has palpitations: supporting dx of POTS  -pt has been afebrile  -pt lacks URI sx/lungs clear on auscultation  -pt's muscle aches have been resolving  -pt states that she does not use recreational drugs  -get orthostatic vitals; in ED pt's bp lying was 133/78 and HR was 126. Standing bp was 127/58 and HR of 133.   -measure change in HR w/ pt lying supine for 10 min and then standing for up to 10 minutes. POTS requires HR increase of at least 30 bpm within the first 10 min of standing w/o orthostatic hypotension  -f/u TSH, T4  -f/u RVP    #Diet: Regular  #GI ppx: protonix  #Code: Full  #DVT Ppx: 40 mg lovenox  #Dispo: medicine w/ w/u then dc home

## 2023-11-08 NOTE — ED ADULT NURSE NOTE - NSFALLRISKINTERV_ED_ALL_ED

## 2023-11-08 NOTE — H&P ADULT - NSHPREVIEWOFSYSTEMS_GEN_ALL_CORE
REVIEW OF SYSTEMS:    CONSTITUTIONAL: No weakness, fevers or chills  EYES/ENT: No visual changes;  No vertigo or throat pain   NECK: No pain or stiffness  RESPIRATORY: No cough, wheezing, hemoptysis; No shortness of breath  CARDIOVASCULAR: No chest pain or palpitations  GASTROINTESTINAL: No abdominal or epigastric pain. No nausea, vomiting, or hematemesis; No diarrhea or constipation. No melena or hematochezia.  GENITOURINARY: No dysuria, frequency or hematuria  NEUROLOGICAL: No numbness or weakness  SKIN: No itching, rashes REVIEW OF SYSTEMS:    CONSTITUTIONAL: Weakness/lightheadedness, no fevers or chills  EYES/ENT: No visual changes;  No vertigo or throat pain   NECK: No pain or stiffness  RESPIRATORY: No cough, wheezing, hemoptysis; shortness of breath (on exertion)  CARDIOVASCULAR: No chest pain. Has palpitations  GASTROINTESTINAL: No abdominal or epigastric pain. No nausea, vomiting, or hematemesis; No diarrhea or constipation. No melena or hematochezia.  GENITOURINARY: No dysuria, frequency or hematuria  NEUROLOGICAL: No numbness.   SKIN: No itching, rashes  MSK: muscle aches

## 2023-11-08 NOTE — ED ADULT NURSE REASSESSMENT NOTE - NS ED NURSE REASSESS COMMENT FT1
MD aware of elevated  to 120s , metoprolol po was given as ordered , pt denies any chest pain this time , Ao x 4 , no respiratory distress  noted , on continuous cardiac monitoring , speaks in full sentences , for telemetry admission , awaiting for telemetry bed

## 2023-11-08 NOTE — ED PROVIDER NOTE - PHYSICAL EXAMINATION
CONSTITUTIONAL: in no apparent distress.   ENT: Hearing is intact with good acuity to spoken voice.  Patient is speaking clearly, not muffled and airway is intact.   RESPIRATORY: No signs of respiratory distress. Lung sounds are clear in all lobes bilaterally without rales, rhonchi, or wheezes.  CARDIOVASCULAR: Tachycardic with regular rhythms.   GI: Abdomen is soft, non-tender, and without distention. Bowel sounds are present and normoactive in all four quadrants. No masses are noted.   NEURO: A & O x 3. Normal speech. No focal deficit.  PSYCHOLOGICAL: Appropriate mood and affect. Good judgement and insight.

## 2023-11-08 NOTE — H&P ADULT - NSHPLABSRESULTS_GEN_ALL_CORE
LABS:  cret                        11.9   5.23  )-----------( 318      ( 08 Nov 2023 16:06 )             35.9     11-08    140  |  107  |  13  ----------------------------<  108<H>  4.8   |  20  |  0.5<L>    Ca    9.2      08 Nov 2023 16:06  Mg     1.8     11-08    TPro  6.3  /  Alb  4.1  /  TBili  <0.2  /  DBili  x   /  AST  19  /  ALT  24  /  AlkPhos  65  11-08    PT/INR - ( 08 Nov 2023 16:06 )   PT: 12.10 sec;   INR: 1.06 ratio         PTT - ( 08 Nov 2023 16:06 )  PTT:33.8 sec    Radiology:     CTA chest PE protocol: no evidence of PE  LE b/l duplex: no DVT LABS:  cret                        11.9   5.23  )-----------( 318      ( 08 Nov 2023 16:06 )             35.9     11-08    140  |  107  |  13  ----------------------------<  108<H>  4.8   |  20  |  0.5<L>    Ca    9.2      08 Nov 2023 16:06  Mg     1.8     11-08    TPro  6.3  /  Alb  4.1  /  TBili  <0.2  /  DBili  x   /  AST  19  /  ALT  24  /  AlkPhos  65  11-08    PT/INR - ( 08 Nov 2023 16:06 )   PT: 12.10 sec;   INR: 1.06 ratio      CTA chest PE study: No PE demonstrated  Venous Duplex LE B/L: prelim report- no DVTs       PTT - ( 08 Nov 2023 16:06 )  PTT:33.8 sec    Radiology:     CTA chest PE protocol: no evidence of PE  LE b/l duplex: no DVT

## 2023-11-08 NOTE — ED PROVIDER NOTE - OBJECTIVE STATEMENT
28-year-old female with no significant past medical history who presented to ED with lightheadedness, general weakness, shortness of breath, nausea.  Reports that symptoms started 2 weeks ago; symptoms are intermittent and there is no alleviating or worsening factor. Denies fever, chest pain, vomiting, abdominal pain, urinary symptoms, change with bowel movement

## 2023-11-08 NOTE — H&P ADULT - TIME BILLING
Patient known to me as PCP. Patient presented to the office yesterday with complaints of SOB / BRYAN. Patient states symptoms worsening over the past 2 weeks.  In the office patient found with HR in 130's at rest and increased to 150's    In the ER patient given 1liter of IVF s without improvement. Labs done.  W/U for PE negative. Pregnancy test negative    afebrile ,  -150    NAD  lungs clear  heart RR, S1S2  Abdomen benign  extremities  no edema    TSH resulted this afternoon    Thyroid Stimulating Hormone, Serum: 0.01 uIU/mL    A/P  Hyperthyroidism  - Endocrine Eval  - continue Bblocker  - start on Methimazole  - f/u labs    D/C planning 24 - 48 hours if stable

## 2023-11-08 NOTE — ED PROVIDER NOTE - ATTENDING APP SHARED VISIT CONTRIBUTION OF CARE
I personally evaluated the patient. I reviewed the Resident´s or Physician Assistant´s note (as assigned above), and agree with the findings and plan except as documented in my note.    28-year-old female presents to the emergency room the following PMDs office for evaluation of tachycardia and weakness.  Patient states she feels tired, general malaise, unable to work, of unclear etiology.  Review of systems otherwise negative.  Went to her PMDs office and was noted to have resting tachycardia up to 120 and 150 with ambulation.  Rhythm was noted regular.  Sent to ED for evaluation and for cardiology evaluation as per PMD.    The review of systems otherwise unremarkable    GENERAL: female in no distress.   HEENT: EOMI non icteric normal mucosa  CHEST: normal work of breathing noted.  CV: pulses intact S1S2 regular  EXTR: FROM   NEURO: AAO 3 no focal deficits  SKIN: normal no pallor  PSYCH: normal mood & mentation    Impression: Tachycardia, weakness    Plan: IV, labs, imaging, supportive care & reevaluation

## 2023-11-08 NOTE — ED PROVIDER NOTE - CLINICAL SUMMARY MEDICAL DECISION MAKING FREE TEXT BOX
28-year-old female presents to the emergency department for palpitations and tachycardia from her PMDs office.  In the emergency department screening exam, labs and imaging, work-up for PE given clinical presentation which was unremarkable.  Patient remains with a resting tachycardia at 130 bedside, regular, unchanged with fluid administration in the ED, no other symptoms to provide supportive care to as a means to combat the tachycardia.  Additional historical items are identified, patient has no autoimmune syndromes but admits to weight gain of late as well as profound fatigue.  Endocrinopathies causing resting tachycardia and the most likely clinical picture at this point, patient seen bedside by cardiology with plan for admission to monitored setting.  We will add on labs and provide supportive medications for rate control.  At the time of disposition, results of work-up discussed with patient [and family] with questions answered, expressed understanding of the medical decision making.  PMD aware of patient coming to the service if she needed to be admitted.

## 2023-11-09 LAB
ALBUMIN SERPL ELPH-MCNC: 3.9 G/DL — SIGNIFICANT CHANGE UP (ref 3.5–5.2)
ALBUMIN SERPL ELPH-MCNC: 3.9 G/DL — SIGNIFICANT CHANGE UP (ref 3.5–5.2)
ALP SERPL-CCNC: 60 U/L — SIGNIFICANT CHANGE UP (ref 30–115)
ALP SERPL-CCNC: 60 U/L — SIGNIFICANT CHANGE UP (ref 30–115)
ALT FLD-CCNC: 19 U/L — SIGNIFICANT CHANGE UP (ref 0–41)
ALT FLD-CCNC: 19 U/L — SIGNIFICANT CHANGE UP (ref 0–41)
ANION GAP SERPL CALC-SCNC: 8 MMOL/L — SIGNIFICANT CHANGE UP (ref 7–14)
ANION GAP SERPL CALC-SCNC: 8 MMOL/L — SIGNIFICANT CHANGE UP (ref 7–14)
AST SERPL-CCNC: 15 U/L — SIGNIFICANT CHANGE UP (ref 0–41)
AST SERPL-CCNC: 15 U/L — SIGNIFICANT CHANGE UP (ref 0–41)
BASOPHILS # BLD AUTO: 0.03 K/UL — SIGNIFICANT CHANGE UP (ref 0–0.2)
BASOPHILS # BLD AUTO: 0.03 K/UL — SIGNIFICANT CHANGE UP (ref 0–0.2)
BASOPHILS NFR BLD AUTO: 0.5 % — SIGNIFICANT CHANGE UP (ref 0–1)
BASOPHILS NFR BLD AUTO: 0.5 % — SIGNIFICANT CHANGE UP (ref 0–1)
BILIRUB SERPL-MCNC: 0.2 MG/DL — SIGNIFICANT CHANGE UP (ref 0.2–1.2)
BILIRUB SERPL-MCNC: 0.2 MG/DL — SIGNIFICANT CHANGE UP (ref 0.2–1.2)
BUN SERPL-MCNC: 12 MG/DL — SIGNIFICANT CHANGE UP (ref 10–20)
BUN SERPL-MCNC: 12 MG/DL — SIGNIFICANT CHANGE UP (ref 10–20)
CALCIUM SERPL-MCNC: 9.3 MG/DL — SIGNIFICANT CHANGE UP (ref 8.4–10.4)
CALCIUM SERPL-MCNC: 9.3 MG/DL — SIGNIFICANT CHANGE UP (ref 8.4–10.4)
CHLORIDE SERPL-SCNC: 106 MMOL/L — SIGNIFICANT CHANGE UP (ref 98–110)
CHLORIDE SERPL-SCNC: 106 MMOL/L — SIGNIFICANT CHANGE UP (ref 98–110)
CO2 SERPL-SCNC: 25 MMOL/L — SIGNIFICANT CHANGE UP (ref 17–32)
CO2 SERPL-SCNC: 25 MMOL/L — SIGNIFICANT CHANGE UP (ref 17–32)
CREAT SERPL-MCNC: 0.5 MG/DL — LOW (ref 0.7–1.5)
CREAT SERPL-MCNC: 0.5 MG/DL — LOW (ref 0.7–1.5)
EGFR: 131 ML/MIN/1.73M2 — SIGNIFICANT CHANGE UP
EGFR: 131 ML/MIN/1.73M2 — SIGNIFICANT CHANGE UP
EOSINOPHIL # BLD AUTO: 0.07 K/UL — SIGNIFICANT CHANGE UP (ref 0–0.7)
EOSINOPHIL # BLD AUTO: 0.07 K/UL — SIGNIFICANT CHANGE UP (ref 0–0.7)
EOSINOPHIL NFR BLD AUTO: 1.2 % — SIGNIFICANT CHANGE UP (ref 0–8)
EOSINOPHIL NFR BLD AUTO: 1.2 % — SIGNIFICANT CHANGE UP (ref 0–8)
GLUCOSE SERPL-MCNC: 101 MG/DL — HIGH (ref 70–99)
GLUCOSE SERPL-MCNC: 101 MG/DL — HIGH (ref 70–99)
HCT VFR BLD CALC: 33.8 % — LOW (ref 37–47)
HCT VFR BLD CALC: 33.8 % — LOW (ref 37–47)
HGB BLD-MCNC: 11 G/DL — LOW (ref 12–16)
HGB BLD-MCNC: 11 G/DL — LOW (ref 12–16)
IMM GRANULOCYTES NFR BLD AUTO: 0.2 % — SIGNIFICANT CHANGE UP (ref 0.1–0.3)
IMM GRANULOCYTES NFR BLD AUTO: 0.2 % — SIGNIFICANT CHANGE UP (ref 0.1–0.3)
LYMPHOCYTES # BLD AUTO: 2.67 K/UL — SIGNIFICANT CHANGE UP (ref 1.2–3.4)
LYMPHOCYTES # BLD AUTO: 2.67 K/UL — SIGNIFICANT CHANGE UP (ref 1.2–3.4)
LYMPHOCYTES # BLD AUTO: 44.9 % — SIGNIFICANT CHANGE UP (ref 20.5–51.1)
LYMPHOCYTES # BLD AUTO: 44.9 % — SIGNIFICANT CHANGE UP (ref 20.5–51.1)
MAGNESIUM SERPL-MCNC: 1.8 MG/DL — SIGNIFICANT CHANGE UP (ref 1.8–2.4)
MAGNESIUM SERPL-MCNC: 1.8 MG/DL — SIGNIFICANT CHANGE UP (ref 1.8–2.4)
MCHC RBC-ENTMCNC: 28.2 PG — SIGNIFICANT CHANGE UP (ref 27–31)
MCHC RBC-ENTMCNC: 28.2 PG — SIGNIFICANT CHANGE UP (ref 27–31)
MCHC RBC-ENTMCNC: 32.5 G/DL — SIGNIFICANT CHANGE UP (ref 32–37)
MCHC RBC-ENTMCNC: 32.5 G/DL — SIGNIFICANT CHANGE UP (ref 32–37)
MCV RBC AUTO: 86.7 FL — SIGNIFICANT CHANGE UP (ref 81–99)
MCV RBC AUTO: 86.7 FL — SIGNIFICANT CHANGE UP (ref 81–99)
MONOCYTES # BLD AUTO: 0.86 K/UL — HIGH (ref 0.1–0.6)
MONOCYTES # BLD AUTO: 0.86 K/UL — HIGH (ref 0.1–0.6)
MONOCYTES NFR BLD AUTO: 14.5 % — HIGH (ref 1.7–9.3)
MONOCYTES NFR BLD AUTO: 14.5 % — HIGH (ref 1.7–9.3)
NEUTROPHILS # BLD AUTO: 2.3 K/UL — SIGNIFICANT CHANGE UP (ref 1.4–6.5)
NEUTROPHILS # BLD AUTO: 2.3 K/UL — SIGNIFICANT CHANGE UP (ref 1.4–6.5)
NEUTROPHILS NFR BLD AUTO: 38.7 % — LOW (ref 42.2–75.2)
NEUTROPHILS NFR BLD AUTO: 38.7 % — LOW (ref 42.2–75.2)
NRBC # BLD: 0 /100 WBCS — SIGNIFICANT CHANGE UP (ref 0–0)
NRBC # BLD: 0 /100 WBCS — SIGNIFICANT CHANGE UP (ref 0–0)
PLATELET # BLD AUTO: 285 K/UL — SIGNIFICANT CHANGE UP (ref 130–400)
PLATELET # BLD AUTO: 285 K/UL — SIGNIFICANT CHANGE UP (ref 130–400)
PMV BLD: 10.1 FL — SIGNIFICANT CHANGE UP (ref 7.4–10.4)
PMV BLD: 10.1 FL — SIGNIFICANT CHANGE UP (ref 7.4–10.4)
POTASSIUM SERPL-MCNC: 4.6 MMOL/L — SIGNIFICANT CHANGE UP (ref 3.5–5)
POTASSIUM SERPL-MCNC: 4.6 MMOL/L — SIGNIFICANT CHANGE UP (ref 3.5–5)
POTASSIUM SERPL-SCNC: 4.6 MMOL/L — SIGNIFICANT CHANGE UP (ref 3.5–5)
POTASSIUM SERPL-SCNC: 4.6 MMOL/L — SIGNIFICANT CHANGE UP (ref 3.5–5)
PROT SERPL-MCNC: 5.7 G/DL — LOW (ref 6–8)
PROT SERPL-MCNC: 5.7 G/DL — LOW (ref 6–8)
RBC # BLD: 3.9 M/UL — LOW (ref 4.2–5.4)
RBC # BLD: 3.9 M/UL — LOW (ref 4.2–5.4)
RBC # FLD: 12.4 % — SIGNIFICANT CHANGE UP (ref 11.5–14.5)
RBC # FLD: 12.4 % — SIGNIFICANT CHANGE UP (ref 11.5–14.5)
SODIUM SERPL-SCNC: 139 MMOL/L — SIGNIFICANT CHANGE UP (ref 135–146)
SODIUM SERPL-SCNC: 139 MMOL/L — SIGNIFICANT CHANGE UP (ref 135–146)
T3 SERPL-MCNC: 566 NG/DL — HIGH (ref 80–200)
T3 SERPL-MCNC: 566 NG/DL — HIGH (ref 80–200)
T4 AB SER-ACNC: 15.9 UG/DL — HIGH (ref 4.6–12)
T4 AB SER-ACNC: 15.9 UG/DL — HIGH (ref 4.6–12)
T4 AB SER-ACNC: 19 UG/DL — HIGH (ref 4.6–12)
T4 AB SER-ACNC: 19 UG/DL — HIGH (ref 4.6–12)
T4 FREE SERPL-MCNC: 6.4 NG/DL — HIGH (ref 0.9–1.8)
T4 FREE SERPL-MCNC: 6.4 NG/DL — HIGH (ref 0.9–1.8)
TSH SERPL-MCNC: 0.01 UIU/ML — LOW (ref 0.27–4.2)
WBC # BLD: 5.94 K/UL — SIGNIFICANT CHANGE UP (ref 4.8–10.8)
WBC # BLD: 5.94 K/UL — SIGNIFICANT CHANGE UP (ref 4.8–10.8)
WBC # FLD AUTO: 5.94 K/UL — SIGNIFICANT CHANGE UP (ref 4.8–10.8)
WBC # FLD AUTO: 5.94 K/UL — SIGNIFICANT CHANGE UP (ref 4.8–10.8)

## 2023-11-09 RX ORDER — ACETAMINOPHEN 500 MG
650 TABLET ORAL ONCE
Refills: 0 | Status: COMPLETED | OUTPATIENT
Start: 2023-11-09 | End: 2023-11-09

## 2023-11-09 RX ORDER — ENOXAPARIN SODIUM 100 MG/ML
40 INJECTION SUBCUTANEOUS EVERY 24 HOURS
Refills: 0 | Status: DISCONTINUED | OUTPATIENT
Start: 2023-11-09 | End: 2023-11-10

## 2023-11-09 RX ORDER — PANTOPRAZOLE SODIUM 20 MG/1
40 TABLET, DELAYED RELEASE ORAL
Refills: 0 | Status: DISCONTINUED | OUTPATIENT
Start: 2023-11-09 | End: 2023-11-10

## 2023-11-09 RX ORDER — METOPROLOL TARTRATE 50 MG
12.5 TABLET ORAL ONCE
Refills: 0 | Status: COMPLETED | OUTPATIENT
Start: 2023-11-09 | End: 2023-11-09

## 2023-11-09 RX ADMIN — Medication 12.5 MILLIGRAM(S): at 12:21

## 2023-11-09 RX ADMIN — Medication 650 MILLIGRAM(S): at 12:14

## 2023-11-09 RX ADMIN — Medication 650 MILLIGRAM(S): at 22:33

## 2023-11-10 ENCOUNTER — TRANSCRIPTION ENCOUNTER (OUTPATIENT)
Age: 28
End: 2023-11-10

## 2023-11-10 VITALS — TEMPERATURE: 97 F

## 2023-11-10 PROBLEM — Z00.00 ENCOUNTER FOR PREVENTIVE HEALTH EXAMINATION: Status: ACTIVE | Noted: 2023-11-10

## 2023-11-10 LAB
RAPID RVP RESULT: SIGNIFICANT CHANGE UP
RAPID RVP RESULT: SIGNIFICANT CHANGE UP
SARS-COV-2 RNA SPEC QL NAA+PROBE: SIGNIFICANT CHANGE UP
SARS-COV-2 RNA SPEC QL NAA+PROBE: SIGNIFICANT CHANGE UP
TSH SERPL-MCNC: 0.02 UIU/ML — LOW (ref 0.27–4.2)
TSH SERPL-MCNC: 0.02 UIU/ML — LOW (ref 0.27–4.2)

## 2023-11-10 PROCEDURE — 99253 IP/OBS CNSLTJ NEW/EST LOW 45: CPT

## 2023-11-10 RX ORDER — PROPRANOLOL HCL 160 MG
1 CAPSULE, EXTENDED RELEASE 24HR ORAL
Qty: 90 | Refills: 0
Start: 2023-11-10 | End: 2023-12-09

## 2023-11-10 RX ORDER — METHIMAZOLE 10 MG/1
1 TABLET ORAL
Qty: 90 | Refills: 0
Start: 2023-11-10 | End: 2023-12-09

## 2023-11-10 RX ORDER — ONDANSETRON 8 MG/1
4 TABLET, FILM COATED ORAL ONCE
Refills: 0 | Status: COMPLETED | OUTPATIENT
Start: 2023-11-10 | End: 2023-11-10

## 2023-11-10 RX ADMIN — PANTOPRAZOLE SODIUM 40 MILLIGRAM(S): 20 TABLET, DELAYED RELEASE ORAL at 06:15

## 2023-11-10 RX ADMIN — ONDANSETRON 4 MILLIGRAM(S): 8 TABLET, FILM COATED ORAL at 12:29

## 2023-11-10 NOTE — DISCHARGE NOTE PROVIDER - PROVIDER TOKENS
PROVIDER:[TOKEN:[[id number]],FOLLOWUP:[2 weeks]],PROVIDER:[TOKEN:[[id number]],FOLLOWUP:[1 month]]

## 2023-11-10 NOTE — DISCHARGE NOTE PROVIDER - HOSPITAL COURSE
Reason for Admission: dyspnea/generalized weakness  History of Present Illness:   28-year-old female with no significant past medical history who presented to ED with lightheadedness, general weakness, shortness of breath, and muscle aches.  Reports that symptoms started 2 weeks ago and her palpitations/racing heart began this past Monday. Pt was seen by her PCP and was found to be tachycardic into the 120s. When walking her HR increased to the 150s.  Pt's symptoms of lightheadedness are intermittent and generally occur w/ standing. Denies fever, chest pain, vomiting, abdominal pain, urinary symptoms, change with bowel movement. Pt has been feeling sweaty and "shaky" but her body temperature has not been elevated. Pt assessed her blood glucose and it was wnl.     VS in the ED were 163/92 mm Hg BP, 129 HR, 16 RR, 98.4 degrees F, 98% SpO2.   Labs were remarkable for slightly elevated d-dimer of 242.     CTA chest PE protocol performed and no PE was demonstrated. B/L LE duplex was performed and preliminary read did not identify existence of any DVTs. Patient remains with a resting tachycardia at 130 bedside, regular, unchanged with fluid administration (1L bolus) in the ED,  Additional historical items are identified, patient has no known autoimmune syndromes/endocrinopathies or FH of hypo/hyperthyroidism but admits to weight gain of late as well as profound fatigue. Pt's muscle aches have decreased and are currently limited to her calves. She lacks URI sx, including cough, post-nasal drip, sore throat. Pt has been having moderate HAs w/o photophobia/phonophobia. These HAs are tension-like in character, w/ pressure above her eyebrows.       · EKG Date/Time: 08-Nov-2023 15:58  · Rate: 122  · Interpretation: abnormal  · Abnormal Rhythm: tachycardia  · MN: 136  · QRS: 76  · QTC: 413  · ST/T Wave: no ST changes noted    # hyperthyroidism , palpitations, heat intolerance   - symptoms started 3 weeks ago , worsening past few days   - labs consistent with hyperthyroidism , patient started to notice improvement in symptoms since started on methimazole , received Iv contrast on admission   - ordered TSI ( thyroid stimulating immunoglobulin ), follow up as OP.  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 Green as outpatient in 4-6 weeks with repeat tfts and thyroid US ( will send prescriptions to patient )

## 2023-11-10 NOTE — PROGRESS NOTE ADULT - ASSESSMENT
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 RTW as able.  - will F/U w PCP in 5-14 days.

## 2023-11-10 NOTE — DISCHARGE NOTE NURSING/CASE MANAGEMENT/SOCIAL WORK - PATIENT PORTAL LINK FT
You can access the FollowMyHealth Patient Portal offered by [facility] by registering at the following website: [email or web address]. By joining "[employer]’s FollowMyHealth portal, you will also be able to view your health information using other applications (apps) compatible with our system.

## 2023-11-10 NOTE — DISCHARGE NOTE PROVIDER - CARE PROVIDER_API CALL
Vaishali Emerson  Internal Medicine  [address], [place] [address]  Phone: [phone]  Fax: [phone]  Follow Up Time: 2 weeks    Norma Acosta  Endocrinology/Metab/Diabetes  [address] [place] [address], [address]  [place] [address]  Phone: [phone]  Fax: [phone]  Follow Up Time: 1 month

## 2023-11-10 NOTE — DISCHARGE NOTE PROVIDER - NSDCMRMEDTOKEN_GEN_ALL_CORE_FT
methIMAzole 10 mg oral tablet: 1 tab(s) orally 3 times a day  propranolol 20 mg oral tablet: 1 tab(s) orally 3 times a day

## 2023-11-10 NOTE — CONSULT NOTE ADULT - ASSESSMENT
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 
27 yo female with tachycardia secondary to hyperthyroidism.  cont inderal  further tx as per endo  echo when heart rate improved

## 2023-11-10 NOTE — CONSULT NOTE ADULT - SUBJECTIVE AND OBJECTIVE BOX
Patient is a 28y old  Female who presents with a chief complaint of dyspnea/generalized weakness (08 Nov 2023 22:32)      HPI:  28-year-old female with no significant past medical history who presented to ED with lightheadedness, general weakness, shortness of breath, and muscle aches.  Reports that symptoms started 2 weeks ago and her palpitations/racing heart began this past Monday. Pt was seen by her PCP and was found to be tachycardic into the 120s. When walking her HR increased to the 150s.  Pt's symptoms of lightheadedness are intermittent and generally occur w/ standing. Denies fever, chest pain, vomiting, abdominal pain, urinary symptoms, change with bowel movement. Pt has been feeling sweaty and "shaky" but her body temperature has not been elevated. Pt assessed her blood glucose and it was wnl.  pt was sleeping a lot and did not lose wt.      VS in the ED were 163/92 mm Hg BP, 129 HR, 16 RR, 98.4 degrees F, 98% SpO2.   Labs were remarkable for slightly elevated d-dimer of 242.     CTA chest PE protocol performed and no PE was demonstrated. B/L LE duplex was performed and preliminary read did not identify existence of any DVTs. Patient remains with a resting tachycardia at 130 bedside, regular, unchanged with fluid administration (1L bolus) in the ED,  Additional historical items are identified, patient has no known autoimmune syndromes/endocrinopathies or FH of hypo/hyperthyroidism but admits to weight gain of late as well as profound fatigue. Pt's muscle aches have decreased and are currently limited to her calves. She lacks URI sx, including cough, post-nasal drip, sore throat. Pt has been having moderate HAs w/o photophobia/phonophobia. These HAs are tension-like in character, w/ pressure above her eyebrows.       · EKG Date/Time: 08-Nov-2023 15:58  · Rate: 122  · Interpretation: abnormal  · Abnormal Rhythm: tachycardia  · FL: 136  · QRS: 76  · QTC: 413  · ST/T Wave: no ST changes noted       (08 Nov 2023 22:32)      PAST MEDICAL & SURGICAL HISTORY:  No pertinent past medical history      History of tonsillectomy      History of umbilical hernia repair  3y old          PREVIOUS DIAGNOSTIC TESTING:      ECHO  FINDINGS:2015 wnl    STRESS TEST  FINDINGS:    CATHETERIZATION  FINDINGS:    MEDICATIONS  (STANDING):  enoxaparin Injectable 40 milliGRAM(s) SubCutaneous every 24 hours  methimazole 20 milliGRAM(s) Oral three times a day  pantoprazole    Tablet 40 milliGRAM(s) Oral before breakfast  propranolol 20 milliGRAM(s) Oral three times a day    MEDICATIONS  (PRN):      FAMILY HISTORY:  FH: diabetes mellitus (Father)        SOCIAL HISTORY:  CIGARETTES:none  ALCOHOL:none  DRUGS:none                      REVIEW OF SYSTEMS:  CONSTITUTIONAL: No distress, Looks stable  NECK: No pain or stiffnes  RESPIRATORY: No cough, wheezing, shortness of breath  CARDIOVASCULAR: No chest pain, SOB,(+)palpitations,(-0 leg swelling  GASTROINTESTINAL: No abdominal or epigastric pain. No nausea, vomiting, or hematemesis;  No melena.  NEUROLOGICAL: No dizziness, headaches, memory loss, loss of strength  SKIN: No itching, burning, rashes, or lesions   ENDOCRINE: No heat or cold intolerance  MUSCULOSKELETAL: No joint pain, No  swelling; No muscle pain  PSYCHIATRIC: No depression, anxiety, mood swings, or difficulty sleeping  ALLERGY: No hives, itching, rash          Vital Signs Last 24 Hrs  T(C): 36.5 (09 Nov 2023 20:53), Max: 36.8 (09 Nov 2023 07:59)  T(F): 97.7 (09 Nov 2023 20:53), Max: 98.3 (09 Nov 2023 15:53)  HR: 100 (09 Nov 2023 20:53) (98 - 126)  BP: 126/80 (09 Nov 2023 20:53) (126/70 - 143/86)  BP(mean): --  RR: 18 (09 Nov 2023 20:53) (18 - 18)  SpO2: 100% (09 Nov 2023 15:53) (97% - 100%)    Parameters below as of 09 Nov 2023 15:53  Patient On (Oxygen Delivery Method): room air                          PHYSICAL EXAM:  GENERAL: No distress, well developed  HEAD:  Atraumatic, Normocephalic  NECK: Supple, No JVD, No Bruit of either carotid arteries  NERVOUS SYSTEM:  Alert, Awake, Oriented to time, place, person; Normal memory and speech; Normal motor Strength 5/5 B/L upper and lower extremities  CHEST/LUNG: Normal air entry to lung base bilaterally; No wheeze, crackle, rales, rhonchi  HEART: Rapid regular heart beat, S1, A2, P2, No S3, No S4, No gallop, No murmur  ABDOMEN: Soft, Non tender, Non distended; Bowel sounds present  EXTREMITIES:  2+ Peripheral Pulses, No clubbing, No edema  SKIN: No rashes or lesions    TELEMETRY:sinus tachycardia    ECG:Diagnosis Line Sinus tachycardia  Otherwise normal ECG      I&O's Detail      LABS:                        11.0   5.94  )-----------( 285      ( 09 Nov 2023 06:48 )             33.8     11-09    139  |  106  |  12  ----------------------------<  101<H>  4.6   |  25  |  0.5<L>    Ca    9.3      09 Nov 2023 06:48  Mg     1.8     11-09    TPro  5.7<L>  /  Alb  3.9  /  TBili  0.2  /  DBili  x   /  AST  15  /  ALT  19  /  AlkPhos  60  11-09    CARDIAC MARKERS ( 08 Nov 2023 16:06 )  x     / <0.01 ng/mL / x     / x     / x          PT/INR - ( 08 Nov 2023 16:06 )   PT: 12.10 sec;   INR: 1.06 ratio         PTT - ( 08 Nov 2023 16:06 )  PTT:33.8 sec  Urinalysis Basic - ( 09 Nov 2023 06:48 )    Color: x / Appearance: x / SG: x / pH: x  Gluc: 101 mg/dL / Ketone: x  / Bili: x / Urobili: x   Blood: x / Protein: x / Nitrite: x   Leuk Esterase: x / RBC: x / WBC x   Sq Epi: x / Non Sq Epi: x / Bacteria: x  tsh 0.01, t4 16      I&O's Summary      RADIOLOGY & ADDITIONAL STUDIES:
HPI:  28-year-old female with no significant past medical history who presented to ED with lightheadedness, general weakness, shortness of breath, and muscle aches.  Reports that symptoms started 2 weeks ago and her palpitations/racing heart began this past Monday. Pt was seen by her PCP and was found to be tachycardic into the 120s. When walking her HR increased to the 150s.  Pt's symptoms of lightheadedness are intermittent and generally occur w/ standing. Denies fever, chest pain, vomiting, abdominal pain, urinary symptoms, change with bowel movement. Pt has been feeling sweaty and "shaky" but her body temperature has not been elevated. Pt assessed her blood glucose and it was wnl.     VS in the ED were 163/92 mm Hg BP, 129 HR, 16 RR, 98.4 degrees F, 98% SpO2.   Labs were remarkable for slightly elevated d-dimer of 242.     CTA chest PE protocol performed and no PE was demonstrated. B/L LE duplex was performed and preliminary read did not identify existence of any DVTs. Patient remains with a resting tachycardia at 130 bedside, regular, unchanged with fluid administration (1L bolus) in the ED,  Additional historical items are identified, patient has no known autoimmune syndromes/endocrinopathies or FH of hypo/hyperthyroidism but admits to weight gain of late as well as profound fatigue. Pt's muscle aches have decreased and are currently limited to her calves. She lacks URI sx, including cough, post-nasal drip, sore throat. Pt has been having moderate HAs w/o photophobia/phonophobia. These HAs are tension-like in character, w/ pressure above her eyebrows.       · EKG Date/Time: 08-Nov-2023 15:58  · Rate: 122  · Interpretation: abnormal  · Abnormal Rhythm: tachycardia  · UT: 136  · QRS: 76  · QTC: 413  · ST/T Wave: no ST changes noted       (08 Nov 2023 22:32)      PAST MEDICAL & SURGICAL HISTORY  No pertinent past medical history    History of tonsillectomy    History of umbilical hernia repair  3y old        FAMILY HISTORY:  FAMILY HISTORY:  FH: diabetes mellitus (Father)        SOCIAL HISTORY:  []smoker  []Alcohol  []Drug    ALLERGIES:  penicillin (Unknown)      MEDICATIONS:  MEDICATIONS  (STANDING):  enoxaparin Injectable 40 milliGRAM(s) SubCutaneous every 24 hours  methimazole 20 milliGRAM(s) Oral three times a day  pantoprazole    Tablet 40 milliGRAM(s) Oral before breakfast  propranolol 20 milliGRAM(s) Oral three times a day    MEDICATIONS  (PRN):      HOME MEDICATIONS:  Home Medications:      VITALS:   T(F): 97.3 (11-10 @ 04:28), Max: 98.4 (11-08 @ 15:08)  HR: 95 (11-10 @ 04:28) (95 - 129)  BP: 118/56 (11-10 @ 04:28) (118/56 - 163/92)  BP(mean): --  RR: 18 (11-10 @ 04:28) (16 - 18)  SpO2: 100% (11-09 @ 15:53) (97% - 100%)    I&O's Summary    09 Nov 2023 07:01  -  10 Nov 2023 07:00  --------------------------------------------------------  IN: 0 mL / OUT: 300 mL / NET: -300 mL        REVIEW OF SYSTEMS:  CONSTITUTIONAL: No weakness, fevers or chills  EYES: No visual changes, occasional pain with movement   ENT: No  throat pain   NECK: No pain or stiffness  RESPIRATORY: No cough, wheezing, hemoptysis; No more shortness of breath  CARDIOVASCULAR:+ palpitations   GASTROINTESTINAL: No abdominal or epigastric pain. No nausea, vomiting, or hematemesis; No diarrhea or constipation.  GENITOURINARY:  Polyuria  NEUROLOGICAL:  + tremors, no Weakness or numbness  SKIN: No itching, no rashes  MSK: no joint pain  + heat intolerance , periods regular but shorter     PHYSICAL EXAM:  GENERAL: Patient is awake , alert and oriented,  not in acute distress  EYES: No proptosis, no lid lag  NECK: + goiter , no palpable nodules   LUNGS: Clear to auscultation bilaterally   CARDIOVASCULAR: S1/S2 present, RRR , no murmurs   EXT: No AARON  NEURO: fine  tremors    LABS:                        11.0   5.94  )-----------( 285      ( 09 Nov 2023 06:48 )             33.8     11-09    139  |  106  |  12  ----------------------------<  101<H>  4.6   |  25  |  0.5<L>    Ca    9.3      09 Nov 2023 06:48  Mg     1.8     11-09    TPro  5.7<L>  /  Alb  3.9  /  TBili  0.2  /  DBili  x   /  AST  15  /  ALT  19  /  AlkPhos  60  11-09    PT/INR - ( 08 Nov 2023 16:06 )   PT: 12.10 sec;   INR: 1.06 ratio         PTT - ( 08 Nov 2023 16:06 )  PTT:33.8 sec    CARDIAC MARKERS ( 08 Nov 2023 16:06 )  x     / <0.01 ng/mL / x     / x     / x              TSH 0.01; Total T3 566  Free T4 6.4

## 2023-11-10 NOTE — PROGRESS NOTE ADULT - SUBJECTIVE AND OBJECTIVE BOX
Chart reviewed, patient examined. Pertinent results reviewed.  Case discussed with HO; specialist f/u reviewed  HD#3    HPI:  28-year-old female with no significant past medical history who presented to ED with lightheadedness, general weakness, shortness of breath, and muscle aches.  Reports that symptoms started 2 weeks ago and her palpitations/racing heart began this past Monday. Pt was seen by her PCP and was found to be tachycardic into the 120s. When walking her HR increased to the 150s. PCP sent patient to the ED.  Pt's symptoms of lightheadedness are intermittent and generally occur w/ standing. Denies fever, chest pain, vomiting, abdominal pain, urinary symptoms, change with bowel movement. Pt has been feeling sweaty and "shaky" but her body temperature has not been elevated. Pt assessed her blood glucose and it was wnl.     VS in the ED were 163/92 mm Hg BP, 129 HR, 16 RR, 98.4 degrees F, 98% SpO2.   Labs were remarkable for slightly elevated d-dimer of 242.     CTA chest PE protocol performed and no PE was demonstrated. B/L LE duplex was performed and preliminary read did not identify existence of any DVTs. Patient remains with a resting tachycardia at 130 bedside, regular, unchanged with fluid administration (1L bolus) in the ED,  Additional historical items are identified, patient has no known autoimmune syndromes/endocrinopathies or FH of hypo/hyperthyroidism but admits to weight gain of late as well as profound fatigue. Pt's muscle aches have decreased and are currently limited to her calves. She lacks URI sx, including cough, post-nasal drip, sore throat. Pt has been having moderate HAs w/o photophobia/phonophobia. These HAs are tension-like in character, w/ pressure above her eyebrows.       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    ALLERGIES:  penicillin (Unknown)      MEDICATIONS:  MEDICATIONS  (STANDING):  enoxaparin Injectable 40 milliGRAM(s) SubCutaneous every 24 hours  methimazole 20 milliGRAM(s) Oral three times a day  pantoprazole    Tablet 40 milliGRAM(s) Oral before breakfast  propranolol 20 milliGRAM(s) Oral three times a day    MEDICATIONS  (PRN):      HOME MEDICATIONS:  Home Medications:      VITALS:   Vital Signs Last 24 Hrs  T(C): 36.3 (10 Nov 2023 04:28), Max: 36.8 (09 Nov 2023 15:53)  T(F): 97.3 (10 Nov 2023 04:28), Max: 98.3 (09 Nov 2023 15:53)  HR: 95 (10 Nov 2023 04:28) (95 - 100)  BP: 118/56 (10 Nov 2023 04:28) (118/56 - 126/80)  BP(mean): --  RR: 18 (10 Nov 2023 04:28) (18 - 18)  SpO2: 100% (09 Nov 2023 15:53) (100% - 100%)    Parameters below as of 09 Nov 2023 15:53  Patient On (Oxygen Delivery Method): room air          REVIEW OF SYSTEMS:  CONSTITUTIONAL: No weakness, fevers or chills  EYES: No visual changes, occasional pain with movement   ENT: No  throat pain   NECK: No pain or stiffness  RESPIRATORY: No cough, wheezing, hemoptysis; No more shortness of breath  CARDIOVASCULAR:+ palpitations   GASTROINTESTINAL: No abdominal or epigastric pain. No nausea, vomiting, or hematemesis; No diarrhea or constipation.  GENITOURINARY:  Polyuria  NEUROLOGICAL:  + tremors, no Weakness or numbness  SKIN: No itching, no rashes  MSK: no joint pain  + heat intolerance , periods regular but shorter     PHYSICAL EXAM:  GENERAL: Patient is awake , alert and oriented,  not in acute distress  EYES: No proptosis, no lid lag  NECK: + goiter , no palpable nodules   LUNGS: Clear to auscultation bilaterally   CARDIOVASCULAR: S1/S2 present, RRR , no murmurs   EXT: No AARON  NEURO: fine  tremors    LABS:                        11.0   5.94  )-----------( 285      ( 09 Nov 2023 06:48 )             33.8     11-09    139  |  106  |  12  ----------------------------<  101<H>  4.6   |  25  |  0.5<L>    Ca    9.3      09 Nov 2023 06:48  Mg     1.8     11-09    TPro  5.7<L>  /  Alb  3.9  /  TBili  0.2  /  DBili  x   /  AST  15  /  ALT  19  /  AlkPhos  60  11-09    PT/INR - ( 08 Nov 2023 16:06 )   PT: 12.10 sec;   INR: 1.06 ratio         PTT - ( 08 Nov 2023 16:06 )  PTT:33.8 sec    CARDIAC MARKERS ( 08 Nov 2023 16:06 )  x     / <0.01 ng/mL / x     / x     / x              TSH 0.01; Total T3 566  Free T4 6.4   Chart reviewed, patient examined. Pertinent results reviewed.  Case discussed with HO; specialist f/u reviewed  HD#3; Patient new to me today    HPI:  28-year-old female with no significant past medical history who presented to ED with lightheadedness, general weakness, shortness of breath, and muscle aches.  Reports that symptoms started 2 weeks ago and her palpitations/racing heart began this past Monday. Pt was seen by her PCP and was found to be tachycardic into the 120s. When walking her HR increased to the 150s. PCP sent patient to the ED.  Pt's symptoms of lightheadedness are intermittent and generally occur w/ standing. Denies fever, chest pain, vomiting, abdominal pain, urinary symptoms, change with bowel movement. Pt has been feeling sweaty and "shaky" but her body temperature has not been elevated. Pt assessed her blood glucose and it was wnl.     VS in the ED were 163/92 mm Hg BP, 129 HR, 16 RR, 98.4 degrees F, 98% SpO2.   Labs were remarkable for slightly elevated d-dimer of 242.     CTA chest PE protocol performed and no PE was demonstrated. B/L LE duplex was performed and preliminary read did not identify existence of any DVTs. Patient remains with a resting tachycardia at 130 bedside, regular, unchanged with fluid administration (1L bolus) in the ED,  Additional historical items are identified, patient has no known autoimmune syndromes/endocrinopathies or FH of hypo/hyperthyroidism but admits to weight gain of late as well as profound fatigue. Pt's muscle aches have decreased and are currently limited to her calves. She lacks URI sx, including cough, post-nasal drip, sore throat. Pt has been having moderate HAs w/o photophobia/phonophobia. These HAs are tension-like in character, w/ pressure above her eyebrows.       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)      MEDICATIONS:  MEDICATIONS  (STANDING):  enoxaparin Injectable 40 milliGRAM(s) SubCutaneous every 24 hours  methimazole 20 milliGRAM(s) Oral three times a day  pantoprazole    Tablet 40 milliGRAM(s) Oral before breakfast  propranolol 20 milliGRAM(s) Oral three times a day    MEDICATIONS  (PRN):      HOME MEDICATIONS:  Home Medications:      VITALS:   Vital Signs Last 24 Hrs  T(C): 36.3 (10 Nov 2023 04:28), Max: 36.8 (09 Nov 2023 15:53)  T(F): 97.3 (10 Nov 2023 04:28), Max: 98.3 (09 Nov 2023 15:53)  HR: 95 (10 Nov 2023 04:28) (95 - 100)  BP: 118/56 (10 Nov 2023 04:28) (118/56 - 126/80)  BP(mean): --  RR: 18 (10 Nov 2023 04:28) (18 - 18)  SpO2: 100% (09 Nov 2023 15:53) (100% - 100%)    Parameters below as of 09 Nov 2023 15:53  Patient On (Oxygen Delivery Method): room air          REVIEW OF SYSTEMS:  CONSTITUTIONAL: No weakness, fevers or chills  EYES: No visual changes, occasional pain with movement   ENT: No  throat pain   NECK: No pain or stiffness  RESPIRATORY: No cough, wheezing, hemoptysis; No more shortness of breath  CARDIOVASCULAR:+ palpitations - PTA- improved  GASTROINTESTINAL: No abdominal or epigastric pain. No nausea, vomiting, or hematemesis; No diarrhea or constipation.  GENITOURINARY:  Polyuria  NEUROLOGICAL:  + tremors PTA- improved, no Weakness or numbness  SKIN: No itching, no rashes  MSK: no joint pain  + heat intolerance , periods regular but shorter     PHYSICAL EXAM:  GENERAL: Patient is awake , alert and oriented,  not in acute distress  EYES: No proptosis, no lid lag; PEERLA  NECK: Min palpable symmetrical thyroid , no palpable nodules   LUNGS: Clear  bilaterally   CARDIOVASCULAR: RRR, no MRG  ABD: obese; BS+, benign  EXT: No CCE  NEURO: Min  tremors    LABS:                        11.0   5.94  )-----------( 285      ( 09 Nov 2023 06:48 )             33.8     11-09    139  |  106  |  12  ----------------------------<  101<H>  4.6   |  25  |  0.5<L>    Ca    9.3      09 Nov 2023 06:48  Mg     1.8     11-09    TPro  5.7<L>  /  Alb  3.9  /  TBili  0.2  /  DBili  x   /  AST  15  /  ALT  19  /  AlkPhos  60  11-09    PT/INR - ( 08 Nov 2023 16:06 )   PT: 12.10 sec;   INR: 1.06 ratio         PTT - ( 08 Nov 2023 16:06 )  PTT:33.8 sec    CARDIAC MARKERS ( 08 Nov 2023 16:06 )  x     / <0.01 ng/mL / x     / x     / x              TSH 0.01; Total T3 566  Free T4 6.4

## 2023-11-10 NOTE — DISCHARGE NOTE PROVIDER - NSDCCPCAREPLAN_GEN_ALL_CORE_FT
PRINCIPAL DISCHARGE DIAGNOSIS  Diagnosis: Hyperthyroidism  Assessment and Plan of Treatment: You came in with symptoms of hyperthyroidism, and your TSH was 0.01. You were started on methimazole and propranolol. Please take your medicines as prescirbed. You will need to follow up with Dr. Norma Acosta in 4-6 weeks in her office after discharge. Please do the required lab work lab before the appointment.

## 2023-11-11 LAB
THYROGLOB AB FLD IA-ACNC: 212.1 IU/ML — HIGH (ref 0–0.9)
THYROGLOB AB FLD IA-ACNC: 212.1 IU/ML — HIGH (ref 0–0.9)
THYROGLOB AB SERPL-ACNC: 212.1 IU/ML — HIGH (ref 0–0.9)
THYROGLOB AB SERPL-ACNC: 212.1 IU/ML — HIGH (ref 0–0.9)
THYROPEROXIDASE AB SERPL-ACNC: <9 IU/ML — SIGNIFICANT CHANGE UP (ref 0–34)
THYROPEROXIDASE AB SERPL-ACNC: <9 IU/ML — SIGNIFICANT CHANGE UP (ref 0–34)

## 2023-11-14 LAB
TSI ACT/NOR SER: 4.13 IU/L — HIGH (ref 0–0.55)
TSI ACT/NOR SER: 4.13 IU/L — HIGH (ref 0–0.55)

## 2023-11-15 DIAGNOSIS — E05.90 THYROTOXICOSIS, UNSPECIFIED WITHOUT THYROTOXIC CRISIS OR STORM: ICD-10-CM

## 2023-11-15 DIAGNOSIS — G90.A POSTURAL ORTHOSTATIC TACHYCARDIA SYNDROME [POTS]: ICD-10-CM

## 2023-11-15 DIAGNOSIS — Z11.52 ENCOUNTER FOR SCREENING FOR COVID-19: ICD-10-CM

## 2023-11-15 DIAGNOSIS — Z88.0 ALLERGY STATUS TO PENICILLIN: ICD-10-CM

## 2023-11-22 ENCOUNTER — OUTPATIENT (OUTPATIENT)
Dept: OUTPATIENT SERVICES | Facility: HOSPITAL | Age: 28
LOS: 1 days | End: 2023-11-22
Payer: COMMERCIAL

## 2023-11-22 ENCOUNTER — TRANSCRIPTION ENCOUNTER (OUTPATIENT)
Age: 28
End: 2023-11-22

## 2023-11-22 ENCOUNTER — RESULT REVIEW (OUTPATIENT)
Age: 28
End: 2023-11-22

## 2023-11-22 DIAGNOSIS — Z00.8 ENCOUNTER FOR OTHER GENERAL EXAMINATION: ICD-10-CM

## 2023-11-22 DIAGNOSIS — Z98.890 OTHER SPECIFIED POSTPROCEDURAL STATES: Chronic | ICD-10-CM

## 2023-11-22 DIAGNOSIS — Z90.89 ACQUIRED ABSENCE OF OTHER ORGANS: Chronic | ICD-10-CM

## 2023-11-22 DIAGNOSIS — E05.90 THYROTOXICOSIS, UNSPECIFIED WITHOUT THYROTOXIC CRISIS OR STORM: ICD-10-CM

## 2023-11-22 PROCEDURE — 76536 US EXAM OF HEAD AND NECK: CPT | Mod: 26

## 2023-11-22 PROCEDURE — 76536 US EXAM OF HEAD AND NECK: CPT

## 2023-11-23 DIAGNOSIS — E05.90 THYROTOXICOSIS, UNSPECIFIED WITHOUT THYROTOXIC CRISIS OR STORM: ICD-10-CM

## 2023-11-29 ENCOUNTER — APPOINTMENT (OUTPATIENT)
Dept: ENDOCRINOLOGY | Facility: CLINIC | Age: 28
End: 2023-11-29
Payer: COMMERCIAL

## 2023-11-29 VITALS
OXYGEN SATURATION: 98 % | DIASTOLIC BLOOD PRESSURE: 88 MMHG | HEART RATE: 75 BPM | WEIGHT: 190 LBS | HEIGHT: 61 IN | SYSTOLIC BLOOD PRESSURE: 128 MMHG | BODY MASS INDEX: 35.87 KG/M2

## 2023-11-29 DIAGNOSIS — R00.2 PALPITATIONS: ICD-10-CM

## 2023-11-29 DIAGNOSIS — R68.89 OTHER GENERAL SYMPTOMS AND SIGNS: ICD-10-CM

## 2023-11-29 DIAGNOSIS — E05.00 THYROTOXICOSIS WITH DIFFUSE GOITER W/OUT THYROTOXIC CRISIS OR STORM: ICD-10-CM

## 2023-11-29 DIAGNOSIS — E05.90 THYROTOXICOSIS, UNSPECIFIED W/OUT THYROTOXIC CRISIS OR STORM: ICD-10-CM

## 2023-11-29 DIAGNOSIS — Z83.3 FAMILY HISTORY OF DIABETES MELLITUS: ICD-10-CM

## 2023-11-29 DIAGNOSIS — Z82.49 FAMILY HISTORY OF ISCHEMIC HEART DISEASE AND OTHER DISEASES OF THE CIRCULATORY SYSTEM: ICD-10-CM

## 2023-11-29 DIAGNOSIS — Z78.9 OTHER SPECIFIED HEALTH STATUS: ICD-10-CM

## 2023-11-29 PROCEDURE — 99214 OFFICE O/P EST MOD 30 MIN: CPT

## 2023-11-29 RX ORDER — METHIMAZOLE 10 MG/1
10 TABLET ORAL
Refills: 0 | Status: ACTIVE | COMMUNITY

## 2023-11-30 RX ORDER — METHIMAZOLE 10 MG/1
10 TABLET ORAL
Qty: 120 | Refills: 3 | Status: ACTIVE | COMMUNITY
Start: 2023-11-29 | End: 1900-01-01

## 2023-11-30 RX ORDER — PROPRANOLOL HYDROCHLORIDE 20 MG/1
20 TABLET ORAL 3 TIMES DAILY
Qty: 90 | Refills: 2 | Status: ACTIVE | COMMUNITY
Start: 1900-01-01 | End: 1900-01-01

## 2023-12-05 RX ORDER — PREDNISONE 10 MG/1
10 TABLET ORAL DAILY
Qty: 5 | Refills: 0 | Status: ACTIVE | COMMUNITY
Start: 2023-12-05 | End: 1900-01-01

## 2024-01-11 ENCOUNTER — APPOINTMENT (OUTPATIENT)
Dept: ENDOCRINOLOGY | Facility: CLINIC | Age: 29
End: 2024-01-11
